# Patient Record
Sex: FEMALE | Race: WHITE | Employment: UNEMPLOYED | URBAN - NONMETROPOLITAN AREA
[De-identification: names, ages, dates, MRNs, and addresses within clinical notes are randomized per-mention and may not be internally consistent; named-entity substitution may affect disease eponyms.]

---

## 2019-01-23 ENCOUNTER — HOSPITAL ENCOUNTER (EMERGENCY)
Age: 85
Discharge: HOME | End: 2019-01-23
Attending: FAMILY MEDICINE
Payer: MEDICARE

## 2019-01-23 VITALS
WEIGHT: 111 LBS | DIASTOLIC BLOOD PRESSURE: 83 MMHG | OXYGEN SATURATION: 96 % | SYSTOLIC BLOOD PRESSURE: 178 MMHG | RESPIRATION RATE: 16 BRPM | HEART RATE: 91 BPM | TEMPERATURE: 98.5 F

## 2019-01-23 DIAGNOSIS — J02.9 VIRAL PHARYNGITIS: Primary | ICD-10-CM

## 2019-01-23 LAB
DIRECT EXAM: NORMAL
Lab: NORMAL
SPECIMEN DESCRIPTION: NORMAL
STATUS: NORMAL

## 2019-01-23 PROCEDURE — 99283 EMERGENCY DEPT VISIT LOW MDM: CPT

## 2019-01-23 PROCEDURE — 6370000000 HC RX 637 (ALT 250 FOR IP): Performed by: FAMILY MEDICINE

## 2019-01-23 PROCEDURE — 6360000002 HC RX W HCPCS: Performed by: FAMILY MEDICINE

## 2019-01-23 PROCEDURE — 87880 STREP A ASSAY W/OPTIC: CPT

## 2019-01-23 RX ORDER — HYDRALAZINE HYDROCHLORIDE 25 MG/1
25 TABLET, FILM COATED ORAL 3 TIMES DAILY
COMMUNITY
Start: 2016-03-14

## 2019-01-23 RX ORDER — DEXAMETHASONE SODIUM PHOSPHATE 10 MG/ML
6 INJECTION INTRAMUSCULAR; INTRAVENOUS ONCE
Status: COMPLETED | OUTPATIENT
Start: 2019-01-23 | End: 2019-01-23

## 2019-01-23 RX ORDER — PANTOPRAZOLE SODIUM 40 MG/1
40 TABLET, DELAYED RELEASE ORAL DAILY
COMMUNITY

## 2019-01-23 RX ORDER — LISINOPRIL 5 MG/1
5 TABLET ORAL 2 TIMES DAILY
COMMUNITY

## 2019-01-23 RX ORDER — PNV NO.95/FERROUS FUM/FOLIC AC 28MG-0.8MG
325 TABLET ORAL 2 TIMES DAILY
COMMUNITY
Start: 2016-03-14

## 2019-01-23 RX ORDER — LEVOTHYROXINE SODIUM 0.03 MG/1
25 TABLET ORAL DAILY
COMMUNITY
Start: 2016-03-14

## 2019-01-23 RX ORDER — CLOPIDOGREL BISULFATE 75 MG/1
75 TABLET ORAL DAILY
COMMUNITY
Start: 2016-03-14

## 2019-01-23 RX ADMIN — DEXAMETHASONE SODIUM PHOSPHATE 6 MG: 10 INJECTION INTRAMUSCULAR; INTRAVENOUS at 15:23

## 2019-01-23 RX ADMIN — LIDOCAINE HYDROCHLORIDE 15 ML: 20 SOLUTION ORAL; TOPICAL at 15:22

## 2019-01-23 ASSESSMENT — PAIN DESCRIPTION - DESCRIPTORS: DESCRIPTORS: SORE

## 2019-01-23 ASSESSMENT — PAIN DESCRIPTION - PAIN TYPE: TYPE: ACUTE PAIN

## 2019-01-23 ASSESSMENT — PAIN DESCRIPTION - ORIENTATION: ORIENTATION: RIGHT

## 2019-01-23 ASSESSMENT — PAIN DESCRIPTION - LOCATION: LOCATION: THROAT

## 2019-01-23 ASSESSMENT — PAIN SCALES - GENERAL: PAINLEVEL_OUTOF10: 5

## 2024-08-01 ENCOUNTER — APPOINTMENT (OUTPATIENT)
Dept: GENERAL RADIOLOGY | Age: 89
End: 2024-08-01
Payer: MEDICARE

## 2024-08-01 ENCOUNTER — APPOINTMENT (OUTPATIENT)
Dept: CT IMAGING | Age: 89
End: 2024-08-01
Payer: MEDICARE

## 2024-08-01 ENCOUNTER — HOSPITAL ENCOUNTER (EMERGENCY)
Age: 89
Discharge: HOME | End: 2024-08-01
Attending: EMERGENCY MEDICINE
Payer: MEDICARE

## 2024-08-01 VITALS
DIASTOLIC BLOOD PRESSURE: 66 MMHG | HEART RATE: 69 BPM | RESPIRATION RATE: 19 BRPM | WEIGHT: 105 LBS | BODY MASS INDEX: 20.62 KG/M2 | SYSTOLIC BLOOD PRESSURE: 165 MMHG | TEMPERATURE: 98.7 F | HEIGHT: 60 IN | OXYGEN SATURATION: 97 %
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
## 2024-08-01 DIAGNOSIS — S80.11XA CONTUSION OF MULTIPLE SITES OF RIGHT LOWER EXTREMITY, INITIAL ENCOUNTER: ICD-10-CM

## 2024-08-01 LAB
ANION GAP SERPL CALCULATED.3IONS-SCNC: 13 MMOL/L (ref 9–17)
BASOPHILS # BLD: 0.04 K/UL (ref 0–0.2)
BASOPHILS NFR BLD: 1 % (ref 0–2)
BUN SERPL-MCNC: 61 MG/DL (ref 8–23)
BUN/CREAT SERPL: 36 (ref 9–20)
CALCIUM SERPL-MCNC: 9.3 MG/DL (ref 8.6–10.4)
CHLORIDE SERPL-SCNC: 103 MMOL/L (ref 98–107)
CO2 SERPL-SCNC: 23 MMOL/L (ref 20–31)
CREAT SERPL-MCNC: 1.7 MG/DL (ref 0.5–0.9)
EOSINOPHIL # BLD: 0.06 K/UL (ref 0–0.4)
EOSINOPHILS RELATIVE PERCENT: 1 % (ref 0–5)
ERYTHROCYTE [DISTWIDTH] IN BLOOD BY AUTOMATED COUNT: 13.2 % (ref 12.1–15.2)
GFR, ESTIMATED: 28 ML/MIN/1.73M2
GLUCOSE SERPL-MCNC: 116 MG/DL (ref 70–99)
HCT VFR BLD AUTO: 32.9 % (ref 36–46)
HGB BLD-MCNC: 10.9 G/DL (ref 12–16)
IMM GRANULOCYTES # BLD AUTO: 0.02 K/UL (ref 0–0.3)
IMM GRANULOCYTES NFR BLD: 0 % (ref 0–5)
LYMPHOCYTES NFR BLD: 1.16 K/UL (ref 1–4.8)
LYMPHOCYTES RELATIVE PERCENT: 15 % (ref 15–40)
MCH RBC QN AUTO: 31.7 PG (ref 26–34)
MCHC RBC AUTO-ENTMCNC: 33.1 G/DL (ref 31–37)
MCV RBC AUTO: 95.6 FL (ref 80–100)
MONOCYTES NFR BLD: 0.47 K/UL (ref 0–1)
MONOCYTES NFR BLD: 6 % (ref 4–8)
NEUTROPHILS NFR BLD: 77 % (ref 47–75)
NEUTS SEG NFR BLD: 6.09 K/UL (ref 2.5–7)
PLATELET # BLD AUTO: 150 K/UL (ref 140–450)
PMV BLD AUTO: 10.4 FL (ref 6–12)
POTASSIUM SERPL-SCNC: 4.5 MMOL/L (ref 3.7–5.3)
RBC # BLD AUTO: 3.44 M/UL (ref 4–5.2)
SODIUM SERPL-SCNC: 139 MMOL/L (ref 135–144)
WBC OTHER # BLD: 7.8 K/UL (ref 3.5–11)

## 2024-08-01 PROCEDURE — 96374 THER/PROPH/DIAG INJ IV PUSH: CPT

## 2024-08-01 PROCEDURE — 80048 BASIC METABOLIC PNL TOTAL CA: CPT

## 2024-08-01 PROCEDURE — 73502 X-RAY EXAM HIP UNI 2-3 VIEWS: CPT

## 2024-08-01 PROCEDURE — 70450 CT HEAD/BRAIN W/O DYE: CPT

## 2024-08-01 PROCEDURE — 36415 COLL VENOUS BLD VENIPUNCTURE: CPT

## 2024-08-01 PROCEDURE — 99284 EMERGENCY DEPT VISIT MOD MDM: CPT

## 2024-08-01 PROCEDURE — 85025 COMPLETE CBC W/AUTO DIFF WBC: CPT

## 2024-08-01 PROCEDURE — 6360000002 HC RX W HCPCS: Performed by: EMERGENCY MEDICINE

## 2024-08-01 PROCEDURE — 73552 X-RAY EXAM OF FEMUR 2/>: CPT

## 2024-08-01 RX ORDER — FENTANYL CITRATE 50 UG/ML
25 INJECTION, SOLUTION INTRAMUSCULAR; INTRAVENOUS ONCE
Status: COMPLETED | OUTPATIENT
Start: 2024-08-01 | End: 2024-08-01

## 2024-08-01 RX ORDER — LANOLIN ALCOHOL/MO/W.PET/CERES
1000 CREAM (GRAM) TOPICAL DAILY
COMMUNITY
Start: 2019-01-28

## 2024-08-01 RX ADMIN — FENTANYL CITRATE 25 MCG: 50 INJECTION, SOLUTION INTRAMUSCULAR; INTRAVENOUS at 20:08

## 2024-08-01 ASSESSMENT — PAIN - FUNCTIONAL ASSESSMENT
PAIN_FUNCTIONAL_ASSESSMENT: PREVENTS OR INTERFERES SOME ACTIVE ACTIVITIES AND ADLS
PAIN_FUNCTIONAL_ASSESSMENT: PREVENTS OR INTERFERES SOME ACTIVE ACTIVITIES AND ADLS
PAIN_FUNCTIONAL_ASSESSMENT: 0-10

## 2024-08-01 ASSESSMENT — ENCOUNTER SYMPTOMS
CHEST TIGHTNESS: 0
RHINORRHEA: 0
TROUBLE SWALLOWING: 0
SORE THROAT: 0
SINUS PRESSURE: 0
CONSTIPATION: 0
NAUSEA: 0
BACK PAIN: 0
EYE PAIN: 0
FACIAL SWELLING: 0
COUGH: 0
ABDOMINAL PAIN: 0
WHEEZING: 0
BLOOD IN STOOL: 0
DIARRHEA: 0
SHORTNESS OF BREATH: 0
VOMITING: 0
COLOR CHANGE: 0
EYE DISCHARGE: 0
EYE REDNESS: 0

## 2024-08-01 ASSESSMENT — PAIN DESCRIPTION - DESCRIPTORS
DESCRIPTORS: ACHING
DESCRIPTORS: ACHING

## 2024-08-01 ASSESSMENT — PAIN DESCRIPTION - ONSET: ONSET: ON-GOING

## 2024-08-01 ASSESSMENT — LIFESTYLE VARIABLES
HOW MANY STANDARD DRINKS CONTAINING ALCOHOL DO YOU HAVE ON A TYPICAL DAY: PATIENT DOES NOT DRINK
HOW OFTEN DO YOU HAVE A DRINK CONTAINING ALCOHOL: NEVER

## 2024-08-01 ASSESSMENT — PAIN DESCRIPTION - ORIENTATION
ORIENTATION: RIGHT
ORIENTATION: RIGHT

## 2024-08-01 ASSESSMENT — PAIN DESCRIPTION - FREQUENCY
FREQUENCY: CONTINUOUS
FREQUENCY: CONTINUOUS

## 2024-08-01 ASSESSMENT — PAIN DESCRIPTION - PAIN TYPE
TYPE: ACUTE PAIN
TYPE: ACUTE PAIN

## 2024-08-01 ASSESSMENT — PAIN DESCRIPTION - LOCATION
LOCATION: LEG
LOCATION: LEG

## 2024-08-01 ASSESSMENT — PAIN SCALES - GENERAL
PAINLEVEL_OUTOF10: 5
PAINLEVEL_OUTOF10: 8

## 2024-08-01 NOTE — ED PROVIDER NOTES
eMERGENCY dEPARTMENT eNCOUnter      Pt Name: Nissa Sawyer  MRN: [id number]  Birthdate 9/28/1930  Date of evaluation: 8/1/24      CHIEF COMPLAINT       Chief Complaint   Patient presents with    Fall     C/o right leg pain after falling this morning around 0700 on a carpetted floor. Patient unknown if head was hit.         HISTORY OF PRESENT ILLNESS    Nissa Sawyer is a 89 y.o. female who presents complaining of fall.  Patient states she had a mechanical fall landing on her right leg.  Patient states she is not able to put any weight on it or straighten her leg and she thinks it is broken.  Patient has a history of hypertension and CVA in the past.  Patient takes Plavix.  Patient states she has no head injury no headache no loss conscious no neck or back pain no numbness tingling or weakness.      REVIEW OF SYSTEMS       Review of Systems   Constitutional:  Negative for activity change, appetite change, chills, diaphoresis and fever.   HENT:  Negative for congestion, ear pain, facial swelling, nosebleeds, rhinorrhea, sinus pressure, sore throat and trouble swallowing.    Eyes:  Negative for pain, discharge and redness.   Respiratory:  Negative for cough, chest tightness, shortness of breath and wheezing.    Cardiovascular:  Negative for chest pain, palpitations and leg swelling.   Gastrointestinal:  Negative for abdominal pain, blood in stool, constipation, diarrhea, nausea and vomiting.   Genitourinary:  Negative for difficulty urinating, dysuria, flank pain, frequency, genital sores and hematuria.   Musculoskeletal:  Negative for arthralgias, back pain, gait problem, joint swelling, myalgias and neck pain.        Fall with leg pain   Skin:  Negative for color change, pallor, rash and wound.   Neurological:  Negative for dizziness, tremors, seizures, syncope, speech difficulty, weakness, numbness and headaches.   Psychiatric/Behavioral:  Negative for confusion, decreased concentration, hallucinations, self-injury,

## 2024-08-02 NOTE — ED NOTES
Ticket to ride completed. The following information was reported off:  Name  Allergies  Orientation Level  Destination  Safety Issues  Code Status  Oxygen Requirements  Special needs including mobility, language, communication

## 2025-05-12 ENCOUNTER — HOSPITAL ENCOUNTER (INPATIENT)
Age: 89
LOS: 3 days | Discharge: NURSING, REHAB OR LONG-TERM CARE | End: 2025-05-15
Attending: EMERGENCY MEDICINE | Admitting: INTERNAL MEDICINE
Payer: MEDICARE

## 2025-05-12 ENCOUNTER — APPOINTMENT (OUTPATIENT)
Dept: GENERAL RADIOLOGY | Age: 89
End: 2025-05-12
Payer: MEDICARE

## 2025-05-12 DIAGNOSIS — N18.2 ACUTE RENAL FAILURE WITH ACUTE TUBULAR NECROSIS SUPERIMPOSED ON STAGE 2 CHRONIC KIDNEY DISEASE: ICD-10-CM

## 2025-05-12 DIAGNOSIS — R53.1 GENERALIZED WEAKNESS: Primary | ICD-10-CM

## 2025-05-12 DIAGNOSIS — N17.0 ACUTE RENAL FAILURE WITH ACUTE TUBULAR NECROSIS SUPERIMPOSED ON STAGE 2 CHRONIC KIDNEY DISEASE: ICD-10-CM

## 2025-05-12 DIAGNOSIS — N39.0 URINARY TRACT INFECTION WITHOUT HEMATURIA, SITE UNSPECIFIED: ICD-10-CM

## 2025-05-12 DIAGNOSIS — J18.9 PNEUMONIA OF RIGHT LOWER LOBE DUE TO INFECTIOUS ORGANISM: ICD-10-CM

## 2025-05-12 PROBLEM — J15.9 COMMUNITY ACQUIRED BACTERIAL PNEUMONIA: Status: ACTIVE | Noted: 2025-05-12

## 2025-05-12 PROBLEM — N30.00 ACUTE CYSTITIS WITHOUT HEMATURIA: Status: ACTIVE | Noted: 2025-05-12

## 2025-05-12 LAB
-: ABNORMAL
ABSOLUTE BANDS: 0.86 K/UL (ref 0–1)
ALBUMIN SERPL-MCNC: 3.4 G/DL (ref 3.5–5.2)
ALBUMIN/GLOB SERPL: 0.9 {RATIO} (ref 1–2.5)
ALP SERPL-CCNC: 105 U/L (ref 35–104)
ALT SERPL-CCNC: 30 U/L (ref 5–33)
ANION GAP SERPL CALCULATED.3IONS-SCNC: 20 MMOL/L (ref 9–17)
AST SERPL-CCNC: 82 U/L
B PARAP IS1001 DNA NPH QL NAA+NON-PROBE: NOT DETECTED
B PERT DNA SPEC QL NAA+PROBE: NOT DETECTED
BACTERIA URNS QL MICRO: ABNORMAL
BANDS: 7 % (ref 0–10)
BASOPHILS # BLD: ABNORMAL K/UL (ref 0–0.2)
BASOPHILS NFR BLD: ABNORMAL % (ref 0–2)
BILIRUB SERPL-MCNC: 0.4 MG/DL (ref 0.3–1.2)
BILIRUB UR QL STRIP: NEGATIVE
BUN SERPL-MCNC: 76 MG/DL (ref 8–23)
C PNEUM DNA NPH QL NAA+NON-PROBE: NOT DETECTED
CALCIUM SERPL-MCNC: 9.4 MG/DL (ref 8.6–10.4)
CHLORIDE SERPL-SCNC: 98 MMOL/L (ref 98–107)
CLARITY UR: ABNORMAL
CO2 SERPL-SCNC: 18 MMOL/L (ref 20–31)
COLOR UR: YELLOW
COMMENT: ABNORMAL
CREAT SERPL-MCNC: 3.1 MG/DL (ref 0.5–0.9)
EOSINOPHIL # BLD: 0.12 K/UL (ref 0–0.4)
EOSINOPHILS RELATIVE PERCENT: 1 % (ref 0–5)
EPI CELLS #/AREA URNS HPF: ABNORMAL /HPF
ERYTHROCYTE [DISTWIDTH] IN BLOOD BY AUTOMATED COUNT: 13.7 % (ref 12.1–15.2)
FLUAV AG SPEC QL: NEGATIVE
FLUAV RNA NPH QL NAA+NON-PROBE: NOT DETECTED
FLUBV AG SPEC QL: NEGATIVE
FLUBV RNA NPH QL NAA+NON-PROBE: NOT DETECTED
GFR, ESTIMATED: 14 ML/MIN/1.73M2
GLUCOSE SERPL-MCNC: 91 MG/DL (ref 70–99)
GLUCOSE UR STRIP-MCNC: NEGATIVE MG/DL
HADV DNA NPH QL NAA+NON-PROBE: NOT DETECTED
HCOV 229E RNA NPH QL NAA+NON-PROBE: NOT DETECTED
HCOV HKU1 RNA NPH QL NAA+NON-PROBE: NOT DETECTED
HCOV NL63 RNA NPH QL NAA+NON-PROBE: NOT DETECTED
HCOV OC43 RNA NPH QL NAA+NON-PROBE: NOT DETECTED
HCT VFR BLD AUTO: 37.7 % (ref 36–46)
HGB BLD-MCNC: 13 G/DL (ref 12–16)
HGB UR QL STRIP.AUTO: ABNORMAL
HMPV RNA NPH QL NAA+NON-PROBE: NOT DETECTED
HPIV1 RNA NPH QL NAA+NON-PROBE: NOT DETECTED
HPIV2 RNA NPH QL NAA+NON-PROBE: NOT DETECTED
HPIV3 RNA NPH QL NAA+NON-PROBE: DETECTED
HPIV4 RNA NPH QL NAA+NON-PROBE: NOT DETECTED
IMM GRANULOCYTES # BLD AUTO: ABNORMAL K/UL (ref 0–0.3)
IMM GRANULOCYTES NFR BLD: ABNORMAL %
KETONES UR STRIP-MCNC: NEGATIVE MG/DL
LACTATE BLDV-SCNC: 1.8 MMOL/L (ref 0.5–1.9)
LEUKOCYTE ESTERASE UR QL STRIP: ABNORMAL
LYMPHOCYTES NFR BLD: 0.62 K/UL (ref 1–4.8)
LYMPHOCYTES RELATIVE PERCENT: 5 % (ref 15–40)
M PNEUMO DNA NPH QL NAA+NON-PROBE: NOT DETECTED
MCH RBC QN AUTO: 30.7 PG (ref 26–34)
MCHC RBC AUTO-ENTMCNC: 34.5 G/DL (ref 31–37)
MCV RBC AUTO: 89.1 FL (ref 80–100)
MONOCYTES NFR BLD: 0.25 K/UL (ref 0–1)
MONOCYTES NFR BLD: 2 % (ref 4–8)
MORPHOLOGY: ABNORMAL
MORPHOLOGY: ABNORMAL
NEUTROPHILS NFR BLD: 85 % (ref 47–75)
NEUTS SEG NFR BLD: 10.45 K/UL (ref 2.5–7)
NITRITE UR QL STRIP: NEGATIVE
PH UR STRIP: 5 [PH] (ref 5–8)
PLATELET # BLD AUTO: 211 K/UL (ref 140–450)
PMV BLD AUTO: 11 FL (ref 6–12)
POTASSIUM SERPL-SCNC: 4.7 MMOL/L (ref 3.7–5.3)
PROT SERPL-MCNC: 7.4 G/DL (ref 6.4–8.3)
PROT UR STRIP-MCNC: ABNORMAL MG/DL
RBC # BLD AUTO: 4.23 M/UL (ref 4–5.2)
RBC #/AREA URNS HPF: ABNORMAL /HPF (ref 0–2)
RSV RNA NPH QL NAA+NON-PROBE: NOT DETECTED
RV+EV RNA NPH QL NAA+NON-PROBE: NOT DETECTED
SARS-COV-2 RDRP RESP QL NAA+PROBE: NOT DETECTED
SARS-COV-2 RNA NPH QL NAA+NON-PROBE: NOT DETECTED
SODIUM SERPL-SCNC: 136 MMOL/L (ref 135–144)
SP GR UR STRIP: 1.02 (ref 1–1.03)
SPECIMEN DESCRIPTION: ABNORMAL
SPECIMEN DESCRIPTION: NORMAL
UROBILINOGEN UR STRIP-ACNC: NORMAL EU/DL (ref 0–1)
WBC #/AREA URNS HPF: ABNORMAL /HPF
WBC OTHER # BLD: 12.3 K/UL (ref 3.5–11)

## 2025-05-12 PROCEDURE — 81001 URINALYSIS AUTO W/SCOPE: CPT

## 2025-05-12 PROCEDURE — 6370000000 HC RX 637 (ALT 250 FOR IP): Performed by: INTERNAL MEDICINE

## 2025-05-12 PROCEDURE — 87186 SC STD MICRODIL/AGAR DIL: CPT

## 2025-05-12 PROCEDURE — 99285 EMERGENCY DEPT VISIT HI MDM: CPT

## 2025-05-12 PROCEDURE — 87088 URINE BACTERIA CULTURE: CPT

## 2025-05-12 PROCEDURE — 85025 COMPLETE CBC W/AUTO DIFF WBC: CPT

## 2025-05-12 PROCEDURE — 51701 INSERT BLADDER CATHETER: CPT

## 2025-05-12 PROCEDURE — 80053 COMPREHEN METABOLIC PANEL: CPT

## 2025-05-12 PROCEDURE — 71045 X-RAY EXAM CHEST 1 VIEW: CPT

## 2025-05-12 PROCEDURE — 87804 INFLUENZA ASSAY W/OPTIC: CPT

## 2025-05-12 PROCEDURE — 2500000003 HC RX 250 WO HCPCS: Performed by: EMERGENCY MEDICINE

## 2025-05-12 PROCEDURE — 83605 ASSAY OF LACTIC ACID: CPT

## 2025-05-12 PROCEDURE — 87635 SARS-COV-2 COVID-19 AMP PRB: CPT

## 2025-05-12 PROCEDURE — 87040 BLOOD CULTURE FOR BACTERIA: CPT

## 2025-05-12 PROCEDURE — 87086 URINE CULTURE/COLONY COUNT: CPT

## 2025-05-12 PROCEDURE — 87899 AGENT NOS ASSAY W/OPTIC: CPT

## 2025-05-12 PROCEDURE — 6360000002 HC RX W HCPCS: Performed by: INTERNAL MEDICINE

## 2025-05-12 PROCEDURE — 94761 N-INVAS EAR/PLS OXIMETRY MLT: CPT

## 2025-05-12 PROCEDURE — 96374 THER/PROPH/DIAG INJ IV PUSH: CPT

## 2025-05-12 PROCEDURE — 1200000000 HC SEMI PRIVATE

## 2025-05-12 PROCEDURE — 2580000003 HC RX 258: Performed by: EMERGENCY MEDICINE

## 2025-05-12 PROCEDURE — 36415 COLL VENOUS BLD VENIPUNCTURE: CPT

## 2025-05-12 PROCEDURE — 0202U NFCT DS 22 TRGT SARS-COV-2: CPT

## 2025-05-12 PROCEDURE — 6360000002 HC RX W HCPCS: Performed by: EMERGENCY MEDICINE

## 2025-05-12 PROCEDURE — 2580000003 HC RX 258: Performed by: INTERNAL MEDICINE

## 2025-05-12 PROCEDURE — 87449 NOS EACH ORGANISM AG IA: CPT

## 2025-05-12 RX ORDER — SODIUM CHLORIDE 9 MG/ML
INJECTION, SOLUTION INTRAVENOUS PRN
Status: DISCONTINUED | OUTPATIENT
Start: 2025-05-12 | End: 2025-05-15 | Stop reason: HOSPADM

## 2025-05-12 RX ORDER — AZITHROMYCIN 250 MG/1
500 TABLET, FILM COATED ORAL EVERY 24 HOURS
Status: COMPLETED | OUTPATIENT
Start: 2025-05-12 | End: 2025-05-14

## 2025-05-12 RX ORDER — HEPARIN SODIUM 5000 [USP'U]/ML
5000 INJECTION, SOLUTION INTRAVENOUS; SUBCUTANEOUS 2 TIMES DAILY
Status: DISCONTINUED | OUTPATIENT
Start: 2025-05-12 | End: 2025-05-15 | Stop reason: HOSPADM

## 2025-05-12 RX ORDER — ONDANSETRON 2 MG/ML
4 INJECTION INTRAMUSCULAR; INTRAVENOUS EVERY 6 HOURS PRN
Status: DISCONTINUED | OUTPATIENT
Start: 2025-05-12 | End: 2025-05-15 | Stop reason: HOSPADM

## 2025-05-12 RX ORDER — ACETAMINOPHEN 325 MG/1
650 TABLET ORAL EVERY 6 HOURS PRN
Status: DISCONTINUED | OUTPATIENT
Start: 2025-05-12 | End: 2025-05-12 | Stop reason: SDUPTHER

## 2025-05-12 RX ORDER — VITAMIN B COMPLEX
1000 TABLET ORAL
Status: DISCONTINUED | OUTPATIENT
Start: 2025-05-13 | End: 2025-05-15 | Stop reason: HOSPADM

## 2025-05-12 RX ORDER — ACETAMINOPHEN 650 MG/1
650 SUPPOSITORY RECTAL EVERY 6 HOURS PRN
Status: DISCONTINUED | OUTPATIENT
Start: 2025-05-12 | End: 2025-05-12 | Stop reason: SDUPTHER

## 2025-05-12 RX ORDER — SODIUM CHLORIDE 0.9 % (FLUSH) 0.9 %
5-40 SYRINGE (ML) INJECTION EVERY 12 HOURS SCHEDULED
Status: DISCONTINUED | OUTPATIENT
Start: 2025-05-12 | End: 2025-05-15 | Stop reason: HOSPADM

## 2025-05-12 RX ORDER — SODIUM CHLORIDE 0.9 % (FLUSH) 0.9 %
5-40 SYRINGE (ML) INJECTION PRN
Status: DISCONTINUED | OUTPATIENT
Start: 2025-05-12 | End: 2025-05-12

## 2025-05-12 RX ORDER — CLOPIDOGREL BISULFATE 75 MG/1
75 TABLET ORAL DAILY
Status: DISCONTINUED | OUTPATIENT
Start: 2025-05-12 | End: 2025-05-15 | Stop reason: HOSPADM

## 2025-05-12 RX ORDER — SODIUM CHLORIDE 0.9 % (FLUSH) 0.9 %
5-40 SYRINGE (ML) INJECTION PRN
Status: DISCONTINUED | OUTPATIENT
Start: 2025-05-12 | End: 2025-05-15 | Stop reason: HOSPADM

## 2025-05-12 RX ORDER — POLYETHYLENE GLYCOL 3350 17 G/17G
17 POWDER, FOR SOLUTION ORAL DAILY PRN
Status: DISCONTINUED | OUTPATIENT
Start: 2025-05-12 | End: 2025-05-15 | Stop reason: HOSPADM

## 2025-05-12 RX ORDER — SODIUM CHLORIDE 9 MG/ML
INJECTION, SOLUTION INTRAVENOUS CONTINUOUS
Status: ACTIVE | OUTPATIENT
Start: 2025-05-12 | End: 2025-05-14

## 2025-05-12 RX ORDER — 0.9 % SODIUM CHLORIDE 0.9 %
1000 INTRAVENOUS SOLUTION INTRAVENOUS ONCE
Status: DISCONTINUED | OUTPATIENT
Start: 2025-05-12 | End: 2025-05-12

## 2025-05-12 RX ORDER — SODIUM CHLORIDE 9 MG/ML
INJECTION, SOLUTION INTRAVENOUS PRN
Status: DISCONTINUED | OUTPATIENT
Start: 2025-05-12 | End: 2025-05-12

## 2025-05-12 RX ORDER — FERROUS SULFATE 325(65) MG
325 TABLET ORAL 2 TIMES DAILY
Status: DISCONTINUED | OUTPATIENT
Start: 2025-05-12 | End: 2025-05-15 | Stop reason: HOSPADM

## 2025-05-12 RX ORDER — ACETAMINOPHEN 325 MG/1
650 TABLET ORAL EVERY 4 HOURS PRN
Status: DISCONTINUED | OUTPATIENT
Start: 2025-05-12 | End: 2025-05-15 | Stop reason: HOSPADM

## 2025-05-12 RX ORDER — 0.9 % SODIUM CHLORIDE 0.9 %
1000 INTRAVENOUS SOLUTION INTRAVENOUS ONCE
Status: COMPLETED | OUTPATIENT
Start: 2025-05-12 | End: 2025-05-12

## 2025-05-12 RX ORDER — LEVOTHYROXINE SODIUM 25 UG/1
25 TABLET ORAL DAILY
Status: DISCONTINUED | OUTPATIENT
Start: 2025-05-12 | End: 2025-05-15 | Stop reason: HOSPADM

## 2025-05-12 RX ORDER — ONDANSETRON 4 MG/1
4 TABLET, ORALLY DISINTEGRATING ORAL EVERY 8 HOURS PRN
Status: DISCONTINUED | OUTPATIENT
Start: 2025-05-12 | End: 2025-05-15 | Stop reason: HOSPADM

## 2025-05-12 RX ORDER — LANOLIN ALCOHOL/MO/W.PET/CERES
1000 CREAM (GRAM) TOPICAL DAILY
Status: DISCONTINUED | OUTPATIENT
Start: 2025-05-12 | End: 2025-05-15 | Stop reason: HOSPADM

## 2025-05-12 RX ORDER — SODIUM CHLORIDE 0.9 % (FLUSH) 0.9 %
5-40 SYRINGE (ML) INJECTION EVERY 12 HOURS SCHEDULED
Status: DISCONTINUED | OUTPATIENT
Start: 2025-05-12 | End: 2025-05-12

## 2025-05-12 RX ADMIN — Medication 1000 MCG: at 17:08

## 2025-05-12 RX ADMIN — AZITHROMYCIN DIHYDRATE 500 MG: 250 TABLET ORAL at 17:07

## 2025-05-12 RX ADMIN — SODIUM CHLORIDE 1000 ML: 0.9 INJECTION, SOLUTION INTRAVENOUS at 13:15

## 2025-05-12 RX ADMIN — LEVOTHYROXINE SODIUM 25 MCG: 0.03 TABLET ORAL at 17:08

## 2025-05-12 RX ADMIN — HEPARIN SODIUM 5000 UNITS: 5000 INJECTION INTRAVENOUS; SUBCUTANEOUS at 21:15

## 2025-05-12 RX ADMIN — CLOPIDOGREL BISULFATE 75 MG: 75 TABLET, FILM COATED ORAL at 17:07

## 2025-05-12 RX ADMIN — WATER 1000 MG: 1 INJECTION INTRAMUSCULAR; INTRAVENOUS; SUBCUTANEOUS at 14:24

## 2025-05-12 RX ADMIN — SODIUM CHLORIDE: 0.9 INJECTION, SOLUTION INTRAVENOUS at 16:04

## 2025-05-12 RX ADMIN — FERROUS SULFATE TAB 325 MG (65 MG ELEMENTAL FE) 325 MG: 325 (65 FE) TAB at 21:15

## 2025-05-12 ASSESSMENT — PAIN - FUNCTIONAL ASSESSMENT: PAIN_FUNCTIONAL_ASSESSMENT: NONE - DENIES PAIN

## 2025-05-12 ASSESSMENT — PAIN SCALES - GENERAL: PAINLEVEL_OUTOF10: 0

## 2025-05-12 NOTE — ED NOTES
[facility]. Sheila stated that she only has lisinopril 5mg and vit d3 on file. Writer then called her PCP office for validation of medications and code status. Norma from Dr. Ahmadi's office stated that the patient has legal issues with POA and family members. I was given the number of her nephew who Norma stated is in charge. I do not have that number or name on file. Supervisor aware of situation. Will call social work to notify them of situation.

## 2025-05-12 NOTE — ED NOTES
Medication list unable to be reviewed or confirmed. Please see previous note on which medications [facility] has recently filled.

## 2025-05-12 NOTE — PROGRESS NOTES
Dorothea Geiger Niece Contact number, ([phone]) at the bedside for comfort, Pt resting comfortably.

## 2025-05-12 NOTE — PROGRESS NOTES
SW called to [facility] per request of nursing as pt had a court hearing with APS in March 2025 trying to locate family to talk to in regards to admitting patient. MAGDALENO spoke with Sienna in administration and she could not provide SW the name and number due to privacy and the case being closed there but said that she would try to determine who was to be in charge and have them call the ER nurse. SW called to ER and left a message with registration regarding the same. Vesta Bradley, MSW, LSW 5/12/2025

## 2025-05-12 NOTE — ED PROVIDER NOTES
EMERGENCY DEPARTMENT ENCOUNTER      CHIEF COMPLAINT    Chief Complaint   Patient presents with    Illness     Vomiting, diarrhea, weakness for past 2-3 days. Per report from [facility], patient unable to walk and very weak.        LENARD Sawyer is a 89 y.o. female who presentsto ED via EMS.  Patient lives at home.  Patient has been congested for several days.  Patient has been progressively getting weaker to the point that she has not been able to ambulate at home.  Patient's grandson called EMS to transport the patient to ED.    PAST MEDICAL HISTORY    Past Medical History:   Diagnosis Date    Cerebral artery occlusion with cerebral infarction (HCC)     Hypertension     Thyroid disease        SURGICAL HISTORY    Past Surgical History:   Procedure Laterality Date    LEG SURGERY Right     [facility] [employer] [facility]       CURRENT MEDICATIONS    Current Outpatient Rx   Medication Sig Dispense Refill    vitamin B-12 (CYANOCOBALAMIN) 1000 MCG tablet Take 1 tablet by mouth daily      Cholecalciferol (VITAMIN D-3 PO) Take by mouth      NONFORMULARY OTC blood pressure support vitamin      clopidogrel (PLAVIX) 75 MG tablet Take 1 tablet by mouth daily      Ferrous Sulfate (IRON) 325 (65 Fe) MG TABS Take 325 mg by mouth 2 times daily      hydrALAZINE (APRESOLINE) 25 MG tablet Take 25 mg by mouth 3 times daily (Patient not taking: Reported on 8/1/2024)      levothyroxine (SYNTHROID) 25 MCG tablet Take 1 tablet by mouth daily      lisinopril (PRINIVIL;ZESTRIL) 5 MG tablet Take 5 mg by mouth 2 times daily (Patient not taking: Reported on 8/1/2024)      pantoprazole (PROTONIX) 40 MG tablet Take 40 mg by mouth daily (Patient not taking: Reported on 8/1/2024)         ALLERGIES    Allergies   Allergen Reactions    Amlodipine      Edema in LE    Metoprolol Hives and Itching       FAMILY HISTORY    No family history on file.    SOCIAL HISTORY    Social History     Socioeconomic History    Marital status:    Tobacco Use

## 2025-05-12 NOTE — ED NOTES
Patient arrived to ED via [facility] due to weakness and slight confusion at home. Per report, grandson stated that patient cannot walk and has been having diarrhea. Patient arrived with large amount of dried stool in vagina and malathi area. Stool was dried and hard. Writer and another nurse cleaned patient and changed patient's brief. Patient cleansed with soap and water and multiple packs of wipes. Straight cath completed post malathi care.

## 2025-05-12 NOTE — ED NOTES
Social work called and notified. She will call to [facility] for further information. Writer called and spoke with Yariel who was with patient earlier in the ED, he stated that she lives at home but he and a nurse stop in to check on her. Yariel stated that her other nephew Sumeet is in charge of medical decisions. Phone number received.

## 2025-05-13 LAB
BASOPHILS # BLD: 0 K/UL (ref 0–0.2)
BASOPHILS NFR BLD: 0 % (ref 0–2)
EOSINOPHIL # BLD: 0.01 K/UL (ref 0–0.4)
EOSINOPHILS RELATIVE PERCENT: 0 % (ref 0–5)
ERYTHROCYTE [DISTWIDTH] IN BLOOD BY AUTOMATED COUNT: 13.7 % (ref 12.1–15.2)
HCT VFR BLD AUTO: 27.7 % (ref 36–46)
HGB BLD-MCNC: 9.9 G/DL (ref 12–16)
IMM GRANULOCYTES # BLD AUTO: 0.03 K/UL (ref 0–0.3)
IMM GRANULOCYTES NFR BLD: 0 % (ref 0–5)
L PNEUMO1 AG UR QL IA.RAPID: NEGATIVE
LYMPHOCYTES NFR BLD: 0.7 K/UL (ref 1–4.8)
LYMPHOCYTES RELATIVE PERCENT: 8 % (ref 15–40)
MCH RBC QN AUTO: 31.5 PG (ref 26–34)
MCHC RBC AUTO-ENTMCNC: 35.7 G/DL (ref 31–37)
MCV RBC AUTO: 88.2 FL (ref 80–100)
MONOCYTES NFR BLD: 0.4 K/UL (ref 0–1)
MONOCYTES NFR BLD: 4 % (ref 4–8)
NEUTROPHILS NFR BLD: 87 % (ref 47–75)
NEUTS SEG NFR BLD: 7.88 K/UL (ref 2.5–7)
PLATELET # BLD AUTO: 181 K/UL (ref 140–450)
PMV BLD AUTO: 11.2 FL (ref 6–12)
RBC # BLD AUTO: 3.14 M/UL (ref 4–5.2)
S PNEUM AG SPEC QL: POSITIVE
SPECIMEN SOURCE: NORMAL
WBC OTHER # BLD: 9 K/UL (ref 3.5–11)

## 2025-05-13 PROCEDURE — 94761 N-INVAS EAR/PLS OXIMETRY MLT: CPT

## 2025-05-13 PROCEDURE — 2500000003 HC RX 250 WO HCPCS: Performed by: INTERNAL MEDICINE

## 2025-05-13 PROCEDURE — 85025 COMPLETE CBC W/AUTO DIFF WBC: CPT

## 2025-05-13 PROCEDURE — 1200000000 HC SEMI PRIVATE

## 2025-05-13 PROCEDURE — 97162 PT EVAL MOD COMPLEX 30 MIN: CPT

## 2025-05-13 PROCEDURE — 6360000002 HC RX W HCPCS: Performed by: INTERNAL MEDICINE

## 2025-05-13 PROCEDURE — 36415 COLL VENOUS BLD VENIPUNCTURE: CPT

## 2025-05-13 PROCEDURE — 6370000000 HC RX 637 (ALT 250 FOR IP): Performed by: INTERNAL MEDICINE

## 2025-05-13 PROCEDURE — 2580000003 HC RX 258: Performed by: INTERNAL MEDICINE

## 2025-05-13 RX ADMIN — LEVOTHYROXINE SODIUM 25 MCG: 0.03 TABLET ORAL at 05:57

## 2025-05-13 RX ADMIN — Medication 1000 UNITS: at 07:49

## 2025-05-13 RX ADMIN — SODIUM CHLORIDE: 0.9 INJECTION, SOLUTION INTRAVENOUS at 11:40

## 2025-05-13 RX ADMIN — HEPARIN SODIUM 5000 UNITS: 5000 INJECTION INTRAVENOUS; SUBCUTANEOUS at 07:50

## 2025-05-13 RX ADMIN — Medication 1000 MCG: at 07:50

## 2025-05-13 RX ADMIN — AZITHROMYCIN DIHYDRATE 500 MG: 250 TABLET ORAL at 16:33

## 2025-05-13 RX ADMIN — HEPARIN SODIUM 5000 UNITS: 5000 INJECTION INTRAVENOUS; SUBCUTANEOUS at 20:45

## 2025-05-13 RX ADMIN — FERROUS SULFATE TAB 325 MG (65 MG ELEMENTAL FE) 325 MG: 325 (65 FE) TAB at 20:45

## 2025-05-13 RX ADMIN — FERROUS SULFATE TAB 325 MG (65 MG ELEMENTAL FE) 325 MG: 325 (65 FE) TAB at 07:49

## 2025-05-13 RX ADMIN — WATER 1000 MG: 1 INJECTION INTRAMUSCULAR; INTRAVENOUS; SUBCUTANEOUS at 14:08

## 2025-05-13 RX ADMIN — CLOPIDOGREL BISULFATE 75 MG: 75 TABLET, FILM COATED ORAL at 07:49

## 2025-05-13 ASSESSMENT — PAIN SCALES - GENERAL: PAINLEVEL_OUTOF10: 0

## 2025-05-13 ASSESSMENT — PAIN - FUNCTIONAL ASSESSMENT: PAIN_FUNCTIONAL_ASSESSMENT: NONE - DENIES PAIN

## 2025-05-13 NOTE — THERAPY EVALUATION
[facility]  Phone: [phone]    Occupational Therapy Evaluation    Date: 2025  Patient Name: Nissa Sawyer        MRN: [id number]    : 1930  (89 y.o.)  Gender: female   Referring Practitioner: Dr. Solorzano  Diagnosis: PNA  Additional Pertinent Hx: Admitted to [facility] on 2025 due to community-acquired bacterial PNA. Referred to OT services to assess ability to care for self.    Past Medical History:   Diagnosis Date    Cerebral artery occlusion with cerebral infarction (HCC)     Hypertension     Thyroid disease      Past Surgical History:   Procedure Laterality Date    LEG SURGERY Right     [facility] [facility]     Subjective:     Subjective: Patient was lying supine in bed upon OT arrival. Was agreeable to OT evaluation.    Objective:     Social/Functional History:  Lives With: Alone  Type of Home: House  Bathroom Shower/Tub: Shower chair with back  Bathroom Toilet: Bedside commode  Bathroom Equipment: Grab bars in shower, Grab bars around toilet  Home Equipment: Walker - Rolling, Wheelchair - Manual    PLOF:  Receives Help From: Personal care attendant  Prior Level of Assist for ADLs: Needs assistance  Prior Level of Assist for Homemaking: Needs assistance  Prior Level of Assist for Transfers: Needs assistance    ADLs:  Feeding: Maximum assistance  Grooming: Maximum assistance  UE Bathing: Maximum assistance  LE Bathing: Maximum assistance  UE Dressing: Maximum assistance  LE Dressing: Maximum assistance  Toileting: Maximum assistance    Transfers:  Supine to Sit: Partial/Moderate assistance  Sit to stand: Moderate assistance, 2 Person assistance   Stand to sit: Moderate assistance, 2 Person assistance    Assessment:     Assessment: Patient was lying supine in bed upon OT arrival. Was agreeable to OT evaluation. Completed ADLs and functional transfers as documented above (based on clinical reasoning and observation). Very limited movement noted in BUE with contractures noted

## 2025-05-13 NOTE — PLAN OF CARE
[facility]  Phone: [phone]    Inpatient Occupational Therapy Plan of Care  OT Orders Received and Evaluation Complete    Date: 2025  Patient Name: Nissa Sawyer        MRN: [id number]    : 1930  (89 y.o.)  Referring Practitioner: Dr. Solorzano  Diagnosis: PNA  Treatment Diagnosis: PNA    Identified Problem Areas:     N/A     Justification for Skilled Services:     N/A    Treatment Plan:     Times Per Week: 1x (evaluation only).  Discharge recommendation: SNF    Goals:     N/A         Frances Pedraza OTR/L                      2025

## 2025-05-13 NOTE — PROGRESS NOTES
Guardianship paperwork provided by pts nephewSumeet. Paperwork given to Social Work by this nurse.

## 2025-05-13 NOTE — PLAN OF CARE
Problem: Discharge Planning  Goal: Discharge to home or other facility with appropriate resources  Outcome: Not Progressing     Problem: Neurosensory - Adult  Goal: Achieves maximal functionality and self care  Outcome: Not Progressing     Problem: Musculoskeletal - Adult  Goal: Return mobility to safest level of function  Outcome: Not Progressing  Goal: Return ADL status to a safe level of function  Outcome: Not Progressing     Problem: Gastrointestinal - Adult  Goal: Maintains adequate nutritional intake  Outcome: Not Progressing     Problem: Discharge Planning  Goal: Discharge to home or other facility with appropriate resources  Outcome: Not Progressing     Problem: Neurosensory - Adult  Goal: Achieves maximal functionality and self care  Outcome: Not Progressing     Problem: Musculoskeletal - Adult  Goal: Return mobility to safest level of function  Outcome: Not Progressing  Goal: Return ADL status to a safe level of function  Outcome: Not Progressing     Problem: Gastrointestinal - Adult  Goal: Maintains adequate nutritional intake  Outcome: Not Progressing

## 2025-05-13 NOTE — PROGRESS NOTES
Rn case manager and SW called to pt's guardian, Sumeet Forte to complete assessment and discharge plan with him. Pt is a 89 year old  female admitted for pneumonia. Pt lives alone in her home in [place] and has private caregivers coming into assist her and guardian is working to hire more so that pt has around the clock care. Pt has a front wheeled walker, wheelchair and BSC to use at home. Pt relies on her caregivers and family to transport her to appointments.     Pt is a DNRCC and follows with Dr Ahmadi as PCP at [facility]. Sumeet was made guardian of pt at the end of April and states that she also has advance directives at her 's office and he plans to talk with them and see what her wishes were. Pt's  calls MAGDALENO and will send pt advance directive documents over for her chart. ACP note completed with Sumeet. Sumeet reports that pt medications are covered well by insurance. Sumeet reports getting her to take the medications correctly has been a chore and she does not eat well at home either.     Discussed with Sumeet possibility of pt needing rehab and Sumeet is in agreement to this as it will allow him more time to make arrangements at home for her. Discussed options for rehab and Sumeet to think about this and talk with  and let SW know choice for facility. MAGDALENO following and will assist with arrangements for SNF care. Vesta Bradley, MSW, LSW 5/13/2025

## 2025-05-13 NOTE — H&P
History & Physical    Patient:  Nissa Sawyer  YOB: 1930  Date of Service: 5/13/2025  MRN: [id number]   Acct:   [id number]   Primary Care Physician: Fitz Ahmadi MD    Chief Complaint:   Chief Complaint   Patient presents with    Illness     Vomiting, diarrhea, weakness for past 2-3 days. Per report from [facility], patient unable to walk and very weak.        History of Present Illness:     History obtained from chart review and unobtainable from patient due to mental status.    The patient is a 89 y.o. female with history of hypertension, iron deficiency anemia, CVA, aortic stenosis, advanced CKD, vascular dementia, hypothyroidism presented to the emergency room by EMS for evaluation of nausea, vomiting diarrhea, generalized weakness for the past 2-3 days.  Patient is confused and unable to provide me with history.  Apparently she has been congested for several days and progressively became weak to the point that she was not able to ambulate at home.  Her grandson called EMS to bring the patient to the emergency room for evaluation.  Workup in the emergency room revealed temperature of 98.1, heart rate 77, respirations 18, /67, 97% on room air.  BMP revealed sodium 136, potassium 4.7, CO2 18, BUN 76, creatinine 3.1, calcium 9.4, glucose 91.  LFTs revealed albumin 3.4, alk phos 105, ALT 30, AST 82.  WBC count was elevated 12.3, H&H 13.0/37.7, platelets 211.  Influenza A and B were negative.  COVID was negative, urinalysis revealed 3+ leukocyte esterase,  WBCs, 4+ bacteria.  Chest x-ray revealed:    IMPRESSION:  1. Multilobar pneumonia in the right lung with small right effusion. Recommend   clinical and radiographic follow-up to resolution.  2. Vague 2 cm opacity in the left mid lung that is new from 3/19/2025,   presumably an inflammatory nodule or small area of pneumonia, but recommend   attention on follow-up studies to document resolution.  3. Hiatal hernia.    Patient was  available immediate resources to obtain, update, or review the patient's current medications (including all prescriptions, over-the-counter products, herbals, cannabis / cannabidiol products, vitamin / mineral / dietary (nutritional) supplements).  (Satisfies MIPS Performance)  If Yes, Stop Here  ( )  The patient is not eligible for medication reconciliation; the patient is in an emergent medical situation where delaying treatment would jeopardize the patient's health.  (MIPS Performance exception / exclusion)  ( )  I did not confirm, update or review the patient's current list of medications today.  (Does not satisfy MIPS Performance)              Advanced Care Plan    ( x)  I confirmed that the patient's Advanced Care Plan is present, code status documented, or surrogate decision maker is listed in the patient's medical record.  ( )  The patient's advanced care plan is not present because:  (select)   ( ) I confirmed today that the patient does not wish or was not able to name a surrogate decision maker or provide an Advance Care Plan.   ( ) Hospice care is currently being provided or has been provided this calender year.  ( )  I did not confirm today the presence of an Advance Care Plan or surrogate decision maker documented within the patient's medical record. (Does not satisfy MIPS performance).            Wilberto Solorzano MD, MD  Admitting Hospitalist

## 2025-05-13 NOTE — ACP (ADVANCE CARE PLANNING)
Advance Care Planning     Advance Care Planning Activator (Inpatient)  Conversation Note      Date of ACP Conversation: 5/13/2025     Conversation Conducted with: Healthcare Decision Maker/ Guardian : Sumeet Forte    ACP Activator: Vesta Bradley, MSW, LSW    {When Decision Maker makes decisions on behalf of the incapacitated patient: Decision Maker is asked to consider and make decisions based on patient values, known preferences, or best interests.     Health Care Decision Maker:     Current Designated Health Care Decision Maker:     Primary Decision Maker: Sumeet Forte - Niece/Nephew - [phone]  Click here to complete Healthcare Decision Makers including section of the Healthcare Decision Maker Relationship (ie \"Primary\")  Today we documented Decision Maker(s) consistent with ACP documents on file. Pt has a guardian, legal document on paper chart.     Care Preferences    Ventilation:  Sumeet would like to talk with pt's  regarding what wishes she had expressed to him prior to him becoming her guardian and will notify of such at a later time.       Resuscitation  \"CPR works best to restart the heart when there is a sudden event, like a heart attack, in someone who is otherwise healthy. Unfortunately, CPR does not typically restart the heart for people who have serious health conditions or who are very sick.\"    \"In the event your heart stopped as a result of an underlying serious health condition, would you want attempts to be made to restart your heart (answer \"yes\" for attempt to resuscitate) or would you prefer a natural death (answer \"no\" for do not attempt to resuscitate)?\" no       [x] Yes   [] No   Educated Patient / Decision Maker regarding differences between Advance Directives and portable DNR orders.    Length of ACP Conversation in minutes:  5    Conversation Outcomes:  ACP discussion completed and Existing advance directive reviewed with patient; no changes to patient's

## 2025-05-13 NOTE — PROGRESS NOTES
Pt requires assistance with eating breakfast. Pt visibly weak in upper extremities. Pt eats 50% of breakfast. Denture and mouth care provided. Dentures with a thick build up and black in color. Dentures remain soaking at this time.

## 2025-05-13 NOTE — THERAPY EVALUATION
[facility]  Physical Therapy  Evaluation  Date: 2025  Patient Name: Nissa Sawyer        MRN: [id number]    : 1930  (89 y.o.)  Gender: female   Referring Practitioner: Dr. Solorzano  Diagnosis: Pnuemonia  Additional Pertinent Hx: Patient admitted with progressive weakness and inability to amb at home and found to have pnuemonia and UTI. Referred to PT for functional mobility.  Patient poor historian due to dementia; family cares for patient however living situation/condition questionable   Past Medical History:   Diagnosis Date    Cerebral artery occlusion with cerebral infarction (HCC)     Hypertension     Thyroid disease      Past Surgical History:   Procedure Laterality Date    LEG SURGERY Right     [facility] [employer] [facility]       Restrictions         Subjective         Pain Level: 0    Orientation       Home Living / Prior Level of Function  Social/Functional History  Lives With: Alone  Hearing  Hearing: Exceptions to WFL  Hearing Exceptions: Hard of hearing/hearing concerns    Objective                    PROM LUE (degrees)  LUE General PROM: Limited shoulder flexion below 60 deg; patient with contractures of fingers and missing thumb following a traumatic work injury 50 years ago  Strength RLE  Comment: Generally 3/5  Strength LLE  Comment: Generally 3/5                Supine to Sit: Moderate assistance  Sit to Stand: Partial/Moderate assistance (x 2)  Bed to Chair: Partial/Moderate assistance (x2 for safety)              Balance  Sitting - Static: Fair, +  Standing - Static: Fair    Assessment  Activity Tolerance: Patient tolerated evaluation without incident   Chart Reviewed: Yes  Assessment: Patient admitted with progressive weakness and inability to amb associated with pnuemonia and UTI.   Patient [place] with a history of dementia and poor historian.   Patient very kypotic and forward flexed in standing.  Required mod assist supine to sit and 2 mod assist bed to chair with wh walker..  Recommend ECF

## 2025-05-13 NOTE — PLAN OF CARE
[facility]  Inpatient Physical Therapy  Plan of Care  Date: 2025  Patient Name: Nissa Sawyer        : 1930  (89 y.o.)  Referring Practitioner: Dr. Solorzano  Admission Date: 2025  Referral Date : 25  Diagnosis: Pnuemonia  Treatment Diagnosis: Weakness; difficulty with amb  PT Orders Received and Evaluation Complete  Identified Problem Areas:  Therapy Prognosis: Fair    Justification for Skilled Services:  [] Reduce Falls   [x] Improve Ambulation  []  Complete Daily Tasks Safely   [] Improve Balance   [] Improve LE strength  []  Return to Prior Level of Function  [x] Improve Functional Mobility   []  Family/Caregiver Education  [x] Patient Education: []Assistive Devices []Home Exercise Program and Progression    Plan  Frequency: 1-2x/day Daily M-F, 1x/day Sat-Sun    Duration: 5 days  [] Modalities:  [x] Therapeutic Exercise   [x] Gait Training  [x] Therapeutic Activity    [] Home Safety Evaluation         [] Massage                        [] Neuromuscular Re-education [] Back Education             [x] Patient Education [] Home Exercise Program  Discharge Recommendations: Continue to assess pending progress, Subacute/Skilled Nursing Facility    Rehab Potential:  []  Good [x] Fair   []  Poor    Goals  Short Term Goals  Time Frame for Short Term Goals: 5 days - expires 25  Short Term Goal 1: Transfer supine to sitting at bedside with min assist  Short Term Goal 2: Transfer bed to [facility] with wh walker and min assist for ADLs  Short Term Goal 3: Ambulate bed to chair with wh walker and min assist x 2    Long Term Goals  Long Term Goal 1: Continue to assess status and make recommendation; patient would benefit from ECF placement    Upon Swingbed Transfer this Plan of Care will be followed until revision is complete.    ELBA PAULINO, PT,    Date: 2025

## 2025-05-13 NOTE — PROGRESS NOTES
Update provided to pts nephewSumeet via phone call. Sumeet reports he has recently been appointed as the pts guardian. This nurse requests documentation of such. Sumeet reports that patient has been \"living alone but private caregivers have been hired recently to help.\" Sumeet reports that patient uses a walker to ambulate, stand by assist. Sumeet reports, \"wanting to keep pt out of the nursing home but if that's what is needed for a little bit to get stronger then that is ok.\"

## 2025-05-13 NOTE — CARE COORDINATION
Case Management Assessment  Initial Evaluation    Date/Time of Evaluation: 5/13/2025 2:11 PM  Assessment Completed by: Adrian Singh RN    If patient is discharged prior to next notation, then this note serves as note for discharge by case management.    Patient Name: Nissa Sawyer                   YOB: 1930  Diagnosis: Generalized weakness [R53.1]  Community acquired bacterial pneumonia [J15.9]  Urinary tract infection without hematuria, site unspecified [N39.0]  Pneumonia of right lower lobe due to infectious organism [J18.9]  Acute renal failure with acute tubular necrosis superimposed on stage 2 chronic kidney disease [N17.0, N18.2]                   Date / Time: 5/12/2025 12:36 PM    Patient Admission Status: Inpatient   Readmission Risk (Low < 19, Mod (19-27), High > 27): Readmission Risk Score: 14.7    Current PCP: Fitz Ahmaid MD  PCP verified by CM? (P) Yes (Dr Ahmadi)    Chart Reviewed: Yes      History Provided by: (P) Other (see comment) (legal guardian-Sumeet Forte)  Patient Orientation: (P) Other (see comment) (dementia)    Patient Cognition: (P) Dementia / Early Alzheimer's    Hospitalization in the last 30 days (Readmission):  No    If yes, Readmission Assessment in CM Navigator will be completed.    Advance Directives:      Code Status: DNR-CC   Patient's Primary Decision Maker is: (P) Named in Scanned ACP Document    Primary Decision Maker: Sumeet Torres - Niece/Nephew - [phone]    Discharge Planning:    Patient lives with: (P) Alone Type of Home: (P) House  Primary Care Giver: (P) Private caregiver  Patient Support Systems include: (P) Other (Comment) (legal guardian, private duty caregivers)   Current Financial resources: (P) Medicare  Current community resources: (P) Legal Services  Current services prior to admission: (P) Durable Medical Equipment, Private Duty Homecare            Current DME: (P) Wheelchair, Walker, Bedside Commode            Type of Home Care  shower chair. He states the home is in need of \"a good cleaning and some home repairs\". He states he became her legal guardian the end of April and recently had surgery. Patient has dementia and no longer drives, he or care givers provide transportation. Medication cost is covered by insurance at this time. PCP is Dr. Ahmadi at [facility]. He prefers patient discharge to local SNF to allow him time to clean the home up and obtain additional equipment. He states he will review local facilities and let staff know. He denies questions at this time. He denies food, housing, financial, or transportation insecurities at this time.     The Plan for Transition of Care is related to the following treatment goals of Generalized weakness [R53.1]  Community acquired bacterial pneumonia [J15.9]  Urinary tract infection without hematuria, site unspecified [N39.0]  Pneumonia of right lower lobe due to infectious organism [J18.9]  Acute renal failure with acute tubular necrosis superimposed on stage 2 chronic kidney disease [N17.0, N18.2]    IF APPLICABLE: The Patient and/or patient representative Nissa and her family were provided with a choice of provider and agrees with the discharge plan. Freedom of choice list with basic dialogue that supports the patient's individualized plan of care/goals and shares the quality data associated with the providers was provided to: (P) Patient Representative   Patient Representative Name: (P) Sumeet Forte-legal guardian     The Patient and/or Patient Representative Agree with the Discharge Plan? (P) Yes    Adrian Singh RN  Case Management Department  Ph: [phone] Fax: [phone]

## 2025-05-14 PROBLEM — E43 SEVERE MALNUTRITION: Status: ACTIVE | Noted: 2025-05-14

## 2025-05-14 LAB
ANION GAP SERPL CALCULATED.3IONS-SCNC: 14 MMOL/L (ref 9–17)
BASOPHILS # BLD: 0.01 K/UL (ref 0–0.2)
BASOPHILS NFR BLD: 0 % (ref 0–2)
BUN SERPL-MCNC: 88 MG/DL (ref 8–23)
CALCIUM SERPL-MCNC: 8.2 MG/DL (ref 8.6–10.4)
CHLORIDE SERPL-SCNC: 109 MMOL/L (ref 98–107)
CO2 SERPL-SCNC: 17 MMOL/L (ref 20–31)
CREAT SERPL-MCNC: 2.9 MG/DL (ref 0.5–0.9)
EOSINOPHIL # BLD: 0.1 K/UL (ref 0–0.4)
EOSINOPHILS RELATIVE PERCENT: 2 % (ref 0–5)
ERYTHROCYTE [DISTWIDTH] IN BLOOD BY AUTOMATED COUNT: 13.9 % (ref 12.1–15.2)
GFR, ESTIMATED: 15 ML/MIN/1.73M2
GLUCOSE SERPL-MCNC: 80 MG/DL (ref 70–99)
HCT VFR BLD AUTO: 27 % (ref 36–46)
HGB BLD-MCNC: 9.5 G/DL (ref 12–16)
IMM GRANULOCYTES # BLD AUTO: 0.06 K/UL (ref 0–0.3)
IMM GRANULOCYTES NFR BLD: 1 % (ref 0–5)
LYMPHOCYTES NFR BLD: 0.9 K/UL (ref 1–4.8)
LYMPHOCYTES RELATIVE PERCENT: 16 % (ref 15–40)
MCH RBC QN AUTO: 31.4 PG (ref 26–34)
MCHC RBC AUTO-ENTMCNC: 35.2 G/DL (ref 31–37)
MCV RBC AUTO: 89.1 FL (ref 80–100)
MICROORGANISM SPEC CULT: ABNORMAL
MONOCYTES NFR BLD: 0.39 K/UL (ref 0–1)
MONOCYTES NFR BLD: 7 % (ref 4–8)
NEUTROPHILS NFR BLD: 73 % (ref 47–75)
NEUTS SEG NFR BLD: 4.04 K/UL (ref 2.5–7)
PLATELET # BLD AUTO: 176 K/UL (ref 140–450)
PMV BLD AUTO: 11.1 FL (ref 6–12)
POTASSIUM SERPL-SCNC: 4 MMOL/L (ref 3.7–5.3)
RBC # BLD AUTO: 3.03 M/UL (ref 4–5.2)
SODIUM SERPL-SCNC: 140 MMOL/L (ref 135–144)
SPECIMEN DESCRIPTION: ABNORMAL
WBC OTHER # BLD: 5.5 K/UL (ref 3.5–11)

## 2025-05-14 PROCEDURE — 94761 N-INVAS EAR/PLS OXIMETRY MLT: CPT

## 2025-05-14 PROCEDURE — 80048 BASIC METABOLIC PNL TOTAL CA: CPT

## 2025-05-14 PROCEDURE — 2500000003 HC RX 250 WO HCPCS: Performed by: INTERNAL MEDICINE

## 2025-05-14 PROCEDURE — 2580000003 HC RX 258: Performed by: INTERNAL MEDICINE

## 2025-05-14 PROCEDURE — 36415 COLL VENOUS BLD VENIPUNCTURE: CPT

## 2025-05-14 PROCEDURE — 6360000002 HC RX W HCPCS: Performed by: INTERNAL MEDICINE

## 2025-05-14 PROCEDURE — 97110 THERAPEUTIC EXERCISES: CPT

## 2025-05-14 PROCEDURE — 85025 COMPLETE CBC W/AUTO DIFF WBC: CPT

## 2025-05-14 PROCEDURE — 1200000000 HC SEMI PRIVATE

## 2025-05-14 PROCEDURE — 6370000000 HC RX 637 (ALT 250 FOR IP): Performed by: INTERNAL MEDICINE

## 2025-05-14 PROCEDURE — 97116 GAIT TRAINING THERAPY: CPT

## 2025-05-14 RX ORDER — TRAMADOL HYDROCHLORIDE 50 MG/1
50 TABLET ORAL EVERY 6 HOURS PRN
Status: DISCONTINUED | OUTPATIENT
Start: 2025-05-14 | End: 2025-05-14

## 2025-05-14 RX ORDER — PREDNISONE 20 MG/1
20 TABLET ORAL DAILY
Status: DISCONTINUED | OUTPATIENT
Start: 2025-05-14 | End: 2025-05-15 | Stop reason: HOSPADM

## 2025-05-14 RX ADMIN — WATER 1000 MG: 1 INJECTION INTRAMUSCULAR; INTRAVENOUS; SUBCUTANEOUS at 14:33

## 2025-05-14 RX ADMIN — LEVOTHYROXINE SODIUM 25 MCG: 0.03 TABLET ORAL at 05:34

## 2025-05-14 RX ADMIN — CLOPIDOGREL BISULFATE 75 MG: 75 TABLET, FILM COATED ORAL at 08:20

## 2025-05-14 RX ADMIN — SODIUM CHLORIDE: 0.9 INJECTION, SOLUTION INTRAVENOUS at 10:12

## 2025-05-14 RX ADMIN — Medication 1000 UNITS: at 08:20

## 2025-05-14 RX ADMIN — AZITHROMYCIN DIHYDRATE 500 MG: 250 TABLET ORAL at 15:26

## 2025-05-14 RX ADMIN — FERROUS SULFATE TAB 325 MG (65 MG ELEMENTAL FE) 325 MG: 325 (65 FE) TAB at 20:08

## 2025-05-14 RX ADMIN — PREDNISONE 20 MG: 20 TABLET ORAL at 10:14

## 2025-05-14 RX ADMIN — SODIUM CHLORIDE, PRESERVATIVE FREE 10 ML: 5 INJECTION INTRAVENOUS at 21:13

## 2025-05-14 RX ADMIN — HEPARIN SODIUM 5000 UNITS: 5000 INJECTION INTRAVENOUS; SUBCUTANEOUS at 20:08

## 2025-05-14 RX ADMIN — HEPARIN SODIUM 5000 UNITS: 5000 INJECTION INTRAVENOUS; SUBCUTANEOUS at 08:20

## 2025-05-14 RX ADMIN — FERROUS SULFATE TAB 325 MG (65 MG ELEMENTAL FE) 325 MG: 325 (65 FE) TAB at 08:20

## 2025-05-14 RX ADMIN — Medication 1000 MCG: at 08:20

## 2025-05-14 ASSESSMENT — PAIN SCALES - GENERAL
PAINLEVEL_OUTOF10: 0
PAINLEVEL_OUTOF10: 0

## 2025-05-14 NOTE — PROGRESS NOTES
Hospitalist Progress Note  5/14/2025 9:17 AM  Subjective:   Admit Date: 5/12/2025  PCP: Fitz Ahmadi MD    Interval History:     Patient is up in chair, states feels sick.  She denies having pain.  She continues to be very weak.  Oral intake is poor.  She had no nausea, no vomiting.    Diet: ADULT DIET; Regular  ADULT ORAL NUTRITION SUPPLEMENT; Breakfast, Lunch, Dinner; Clear Liquid Oral Supplement  Medications:   Scheduled Meds:   Vitamin D  1,000 Units Oral Daily with breakfast    clopidogrel  75 mg Oral Daily    ferrous sulfate  325 mg Oral BID    levothyroxine  25 mcg Oral Daily    vitamin B-12  1,000 mcg Oral Daily    sodium chloride flush  5-40 mL IntraVENous 2 times per day    heparin (porcine)  5,000 Units SubCUTAneous BID    cefTRIAXone (ROCEPHIN) IV  1,000 mg IntraVENous Q24H    And    azithromycin  500 mg Oral Q24H     Continuous Infusions:   sodium chloride      sodium chloride 50 mL/hr at 05/14/25 0655     PRN Medications: sodium chloride flush, sodium chloride, ondansetron **OR** ondansetron, polyethylene glycol, acetaminophen    Objective:   Vitals: /81   Pulse 68   Temp 98 °F (36.7 °C) (Temporal)   Resp 18   Ht 1.524 m (5')   Wt 40.6 kg (89 lb 8.1 oz)   SpO2 97%   BMI 17.48 kg/m²   BMI: Body mass index is 17.48 kg/m².    CBC:   Recent Labs     05/12/25  1310 05/13/25  0405 05/14/25  0425   WBC 12.3* 9.0 5.5   HGB 13.0 9.9* 9.5*    181 176     BMP:    Recent Labs     05/12/25  1310 05/14/25  0425    140   K 4.7 4.0   CL 98 109*   CO2 18* 17*   BUN 76* 88*   CREATININE 3.1* 2.9*   GLUCOSE 91 80     Hepatic:   Recent Labs     05/12/25  1310   AST 82*   ALT 30   BILITOT 0.4   ALKPHOS 105*             Physical Exam:  General Appearance: Up in chair, answers to some of my questions.  In no acute distress  Cardiovascular: normal rate, regular rhythm, normal S1 and S2, 3/6 systolic murmur  Pulmonary/Chest: Bilateral wheezes, no rales or rhonchi, diminished air movement, no  respiratory distress  Abdomen: soft, non-tender, non-distended, normal bowel sounds  Extremities: no cyanosis, clubbing or edema  Skin: warm and dry, no rash   Neurological: Confused      Medical Decision Making (MDM) Data:  Number and Complexity of Problems  Differential Diagnosis: Dehydration, viral illness, UTI, pneumonia  Conditions and Status: Stable, improving     Initial Problem(s) still undergoing workup/evaluation: [Y/N]  New Problems Identified and Addressed this DOS: [-]  Changes/Updates to Differential Diagnosis this DOS: [-]     Communication  Management/test interpretation discussed with external clinician/appropriate source this DOS: [Y/N]  External Clinician (physicians/APCs not in same group/specialty, dieticians,  pharmacists, PT, OT, speech therapists, psychologists): [-] ER physician Dr. Nelson  Appropriate Source (, , , , teacher): [-]  Shared decision making with patient/family this DOS: [-]     MDM Data  Historians other than the patient:  -  External records reviewed:  -  My X-ray interpretation: -  My EKG interpretation:   My CT interpretation:  -  My Ultrasound interpretation:  -  Decision rules / scores evaluated:  -  Management / test interpretation discussed with:   -           Assessment and Plan      1.  Multilobar pneumonia  - continue on IV Rocephin, Zithromax.  Respiratory panel positive for parainfluenza.  Legionella and strep pneumonia antigens pending, blood cultures pending.  Will add small dose prednisone due to wheezing  2.  UTI secondary to E. coli- continue on IV Rocephin  3.  Acute on CKD stage IIIb most likely secondary to dehydration from nausea vomiting -started on IV fluids, renal function improving  4.  Left midlung 2 cm opacity -follow-up imaging recommended  5.  Chronic anemia iron deficiency-on iron replacement  6.  Aortic valve stenosis, moderate.  Patient declined cardiology workup in the past according to PCPs

## 2025-05-14 NOTE — PROGRESS NOTES
Phone: [phone] [facility]  Date: 2025  Fax: [phone]      Physical Therapy    Daily Note    Patient Name: Nissa Sawyer      : 1930  (89 y.o.)  MRN: [id number]       Assessment: Pt is in bed upon arrival.She is agreeable to participate.  She transfers to EOB with MIN A.  She demonstrates good sitting balance.  She transfers to stand with MIN A +2.  Pt extremely kyphotic. She req assist to place left hand on walker. She amb to chair x 5 feet  with +2 MIN assist. Pt with safety concerns d/t trying to sit prior to getting to chair. Once in chair she is moaning but denies pain. She is agreeable to attempt ex. Pt fatigues rapidly and only tomasz 3 reps of LAQ, 5 reps pillow squeeze and is too fatigued to perform ankle pumps. Pt positioned for comfort in reclined position in bedside chair.  arrives  to examine pt  at end of session.      Call light in reach, Use of Gait belt, Nurse Notified, and Left in chair  Time In: 915  Time Out: 938  Timed Coded Minutes: 23  Total Treatment Time: 23       Exercises:  See Flowsheets    Plan  Cont Per Plan Of Care    Goals  Short Term Goals  Time Frame for Short Term Goals: 5 days - expires 25  Short Term Goal 1: Transfer supine to sitting at bedside with min assist  Short Term Goal 2: Transfer bed to BSC with wh walker and min assist for ADLs  Short Term Goal 3: Ambulate bed to chair with wh walker and min assist x 2          Long Term Goals     Long Term Goal 1: Continue to assess status and make recommendation; patient would benefit from ECF placement                Amanda Leslie PTA  Date: 2025

## 2025-05-14 NOTE — PROGRESS NOTES
Comprehensive Nutrition Assessment    Type and Reason for Visit:  Initial, Positive nutrition screen (MST 1)    Nutrition Recommendations/Plan:   Continue current diet.   Start ensure clear  TID with meals.      Malnutrition Assessment:  Malnutrition Status:  Severe malnutrition (05/14/25 0841)    Context:  Acute Illness     Findings of the 6 clinical characteristics of malnutrition:  Energy Intake:  50% or less of estimated energy requirements for 5 or more days  Weight Loss:  Mild weight loss (14.5% x 9 months)     Body Fat Loss:  Moderate body fat loss Orbital, Fat Overlying Ribs, Buccal region   Muscle Mass Loss:  Moderate muscle mass loss Temples (temporalis), Clavicles (pectoralis & deltoids)  Fluid Accumulation:  No fluid accumulation     Strength:  Not Performed    Nutrition Assessment:    Severe malnutrition (acute) r/t inadequate protein/energy intakes aeb weight loss 15.2#/14.5% x 9 months, poor PO-typically eating once daily, vomiting, diarrhea. Recommend probiotic due to ATB therapy. Renal indices elevated, BUN 88/Cr 2.9. GFR 15. Pt reports liking strawberry flavors, drank juice at breakfast and ate 51-75%. States it did not taste right.   In lieu of renal indices will add ensure clear and progress to strawberry ensure enlive as renal indices improve. Pt encouraged to choose cheese, whole milk, whole milk yogurt, peanut butter for calorie density when having meals or snacks.    Nutrition Related Findings:    active bowel sounds, no edema Wound Type: None       Current Nutrition Intake & Therapies:    Average Meal Intake: 51-75%  Average Supplements Intake: None Ordered  ADULT DIET; Regular    Anthropometric Measures:  Height: 152.4 cm (5')  Ideal Body Weight (IBW): 100 lbs (45 kg)    Admission Body Weight: 39.3 kg (86 lb 10 oz)  Current Body Weight: 40.6 kg (89 lb 8.1 oz), 89.5 % IBW. Weight Source: Bed scale  Current BMI (kg/m2): 17.5  Usual Body Weight: 47.6 kg (105 lb) (8/1/24)     % Weight

## 2025-05-14 NOTE — DISCHARGE INSTR - COC
Continuity of Care Form    Patient Name: Nissa Sawyer   :  1930  MRN:  [id number]    Admit date:  2025  Discharge date:  5/15/25    Code Status Order: DNR-CC   Advance Directives:     Admitting Physician:  Wilberto Solorzano MD  PCP: Fitz Ahmadi MD    Discharging Nurse: PHYLICIA Shields  Discharging Hospital Unit/Room#: 0257/0257-01  Discharging Unit Phone Number: [phone]    Emergency Contact:   Extended Emergency Contact Information  Primary Emergency Contact: Arcenio Fortetiffanygalina   [facility]  Home Phone: [phone]  Relation: Brother/Sister  Secondary Emergency Contact: Yariel Geiger  Mobile Phone: [phone]  Relation: Niece/Nephew    Past Surgical History:  Past Surgical History:   Procedure Laterality Date    LEG SURGERY Right     [facility] [employer] [facility]       Immunization History:     There is no immunization history on file for this patient.    Active Problems:  Patient Active Problem List   Diagnosis Code    Acute cystitis without hematuria N30.00    Community acquired bacterial pneumonia J15.9    Severe malnutrition E43       Isolation/Infection:   Isolation            Contact          Patient Infection Status        Infection Onset Added Last Indicated Last Indicated By Review Planned Expiration    Parainfluenza 25 Respiratory Panel, Molecular, with COVID-19 (Restricted: peds pts or suitable admitted adults) 25 history                         Nurse Assessment:  Last Vital Signs: /81   Pulse 81   Temp 98 °F (36.7 °C) (Temporal)   Resp 17   Ht 1.524 m (5')   Wt 40.6 kg (89 lb 8.1 oz)   SpO2 96%   BMI 17.48 kg/m²     Last documented pain score (0-10 scale): Pain Level: 0  Last Weight:   Wt Readings from Last 1 Encounters:   25 40.6 kg (89 lb 8.1 oz)     Mental Status:  disoriented, alert, and thought processes intact    IV Access:  - None    Nursing Mobility/ADLs:  Walking   Assisted  Transfer  Assisted  Bathing  Assisted  Dressing

## 2025-05-14 NOTE — PROGRESS NOTES
SW spoke with guardian this morning and he would prefer pt to go to [facility] or [facility] depending on bed availability. SW made referrals to both and will await response if they can accept. [facility] following. AUGUSTIN Black, RAMANW 5/14/2025     Benjamin returns SW call and requested a few more pieces of information and because she has dementia their policy states they will need to do an on site visit with her and they plan to do that this afternoon. Benjamin will also contact her guardian for any other needed info. MAGDAL[facility] following. AUGUSTIN Black, SONALI 5/14/2025     Benjamin has accepted pt for SNF for transfer tomorrow. [facility] will arrange transport. Pt will need covid test on day of discharge faxed to facility with discharge summary and AVS. Guardian called and message left that [facility] had accepted and plan for discharge tomorrow.  AUGUSTIN Black, SONALI 5/14/2025

## 2025-05-14 NOTE — CARE COORDINATION
05/14/25 1435   IMM Letter   IMM Letter given to Patient/Family/Significant other/Guardian/POA/by: second IMM letter given verbally to legal guardian-Sumeet Forte per Adrian Singh RN   IMM Letter date given: 05/14/25   IMM Letter time given: 0900

## 2025-05-15 VITALS
HEART RATE: 82 BPM | RESPIRATION RATE: 18 BRPM | HEIGHT: 60 IN | TEMPERATURE: 97.7 F | BODY MASS INDEX: 18.31 KG/M2 | OXYGEN SATURATION: 98 % | DIASTOLIC BLOOD PRESSURE: 74 MMHG | WEIGHT: 93.25 LBS | SYSTOLIC BLOOD PRESSURE: 178 MMHG

## 2025-05-15 LAB
ANION GAP SERPL CALCULATED.3IONS-SCNC: 13 MMOL/L (ref 9–17)
BUN SERPL-MCNC: 81 MG/DL (ref 8–23)
CALCIUM SERPL-MCNC: 8.7 MG/DL (ref 8.6–10.4)
CHLORIDE SERPL-SCNC: 110 MMOL/L (ref 98–107)
CO2 SERPL-SCNC: 18 MMOL/L (ref 20–31)
CREAT SERPL-MCNC: 2.6 MG/DL (ref 0.5–0.9)
GFR, ESTIMATED: 17 ML/MIN/1.73M2
GLUCOSE SERPL-MCNC: 129 MG/DL (ref 70–99)
POTASSIUM SERPL-SCNC: 4.1 MMOL/L (ref 3.7–5.3)
SARS-COV-2 RDRP RESP QL NAA+PROBE: NOT DETECTED
SODIUM SERPL-SCNC: 141 MMOL/L (ref 135–144)
SPECIMEN DESCRIPTION: NORMAL

## 2025-05-15 PROCEDURE — 6370000000 HC RX 637 (ALT 250 FOR IP): Performed by: INTERNAL MEDICINE

## 2025-05-15 PROCEDURE — 6360000002 HC RX W HCPCS: Performed by: INTERNAL MEDICINE

## 2025-05-15 PROCEDURE — 94761 N-INVAS EAR/PLS OXIMETRY MLT: CPT

## 2025-05-15 PROCEDURE — 80048 BASIC METABOLIC PNL TOTAL CA: CPT

## 2025-05-15 PROCEDURE — 87635 SARS-COV-2 COVID-19 AMP PRB: CPT

## 2025-05-15 PROCEDURE — 2500000003 HC RX 250 WO HCPCS: Performed by: INTERNAL MEDICINE

## 2025-05-15 PROCEDURE — 36415 COLL VENOUS BLD VENIPUNCTURE: CPT

## 2025-05-15 RX ORDER — HYDRALAZINE HYDROCHLORIDE 25 MG/1
25 TABLET, FILM COATED ORAL EVERY 8 HOURS SCHEDULED
Status: DISCONTINUED | OUTPATIENT
Start: 2025-05-15 | End: 2025-05-15 | Stop reason: HOSPADM

## 2025-05-15 RX ORDER — POLYETHYLENE GLYCOL 3350 17 G/17G
17 POWDER, FOR SOLUTION ORAL DAILY PRN
DISCHARGE
Start: 2025-05-15 | End: 2025-06-14

## 2025-05-15 RX ORDER — HYDRALAZINE HYDROCHLORIDE 20 MG/ML
10 INJECTION INTRAMUSCULAR; INTRAVENOUS ONCE
Status: COMPLETED | OUTPATIENT
Start: 2025-05-15 | End: 2025-05-15

## 2025-05-15 RX ORDER — VITAMIN B COMPLEX
1000 TABLET ORAL
DISCHARGE
Start: 2025-05-15

## 2025-05-15 RX ORDER — PREDNISONE 20 MG/1
20 TABLET ORAL DAILY
Qty: 5 TABLET | Refills: 0 | DISCHARGE
Start: 2025-05-15 | End: 2025-05-20

## 2025-05-15 RX ORDER — HYDRALAZINE HYDROCHLORIDE 25 MG/1
25 TABLET, FILM COATED ORAL EVERY 8 HOURS SCHEDULED
DISCHARGE
Start: 2025-05-15

## 2025-05-15 RX ORDER — ONDANSETRON 4 MG/1
4 TABLET, ORALLY DISINTEGRATING ORAL EVERY 8 HOURS PRN
DISCHARGE
Start: 2025-05-15

## 2025-05-15 RX ADMIN — LEVOTHYROXINE SODIUM 25 MCG: 0.03 TABLET ORAL at 05:16

## 2025-05-15 RX ADMIN — HYDRALAZINE HYDROCHLORIDE 10 MG: 20 INJECTION, SOLUTION INTRAMUSCULAR; INTRAVENOUS at 08:09

## 2025-05-15 RX ADMIN — HYDRALAZINE HYDROCHLORIDE 25 MG: 25 TABLET ORAL at 05:56

## 2025-05-15 ASSESSMENT — PAIN SCALES - GENERAL: PAINLEVEL_OUTOF10: 0

## 2025-05-15 NOTE — DISCHARGE SUMMARY
Hospitalist Discharge Summary    Patient:  Nissa Sawyer  YOB: 1930    MRN: [id number]   Acct: [id number]    Primary Care Physician: Fitz Ahmadi MD    Admit date:  5/12/2025    Discharge date:  5/15/2025       Discharge Diagnoses:   Multilobar pneumonia  2.  UTI secondary to E. Coli  3.  Acute on CKD stage IIIb  4.  Left midlung 2 cm opacity with follow-up imaging recommended  5.  Parainfluenza infection  6.  Chronic iron deficiency anemia  7.  Aortic valve stenosis  8.  Vascular dementia  9.  Hypothyroidism  10.  Severe malnutrition  11.  Generalized weakness  12.  Vitamin D deficiency    Discharge Medications:         Medication List        START taking these medications      amoxicillin-clavulanate 875-125 MG per tablet  Commonly known as: AUGMENTIN  Take 1 tablet by mouth 2 times daily for 7 days     lisinopril 5 MG tablet  Commonly known as: PRINIVIL;ZESTRIL     ondansetron 4 MG disintegrating tablet  Commonly known as: ZOFRAN-ODT  Take 1 tablet by mouth every 8 hours as needed for Nausea or Vomiting     pantoprazole 40 MG tablet  Commonly known as: PROTONIX     polyethylene glycol 17 g packet  Commonly known as: GLYCOLAX  Take 1 packet by mouth daily as needed for Constipation     predniSONE 20 MG tablet  Commonly known as: DELTASONE  Take 1 tablet by mouth daily for 5 days            CHANGE how you take these medications      hydrALAZINE 25 MG tablet  Commonly known as: APRESOLINE  Take 1 tablet by mouth every 8 hours  What changed: when to take this     Vitamin D 25 MCG (1000 UT) Tabs tablet  Commonly known as: CHOLECALCIFEROL  Take 1 tablet by mouth daily (with breakfast)  What changed:   medication strength  how much to take  when to take this            CONTINUE taking these medications      clopidogrel 75 MG tablet  Commonly known as: PLAVIX     Iron 325 (65 Fe) MG Tabs     levothyroxine 25 MCG tablet  Commonly known as: SYNTHROID     vitamin B-12 1000 MCG tablet  Commonly known

## 2025-05-15 NOTE — PROGRESS NOTES
Patient being discharged today to [facility].  Spoke with her nephew/guardian, Sumeet, to update him on time of transport.  Ambulance coordinated to pick Patient up at 10 a.m.  Sumeet reports that Patient is unhappy and wishes to be returning home.  Did speak to Patient and updated Sumeet on this as well.  Much reassurance and emotional support offered to him at this time.      Telephone voicemail message left for Admissions Coordinator at [facility] and discharge paperwork FAXED to facility including HENS, discharge summary, AVS/SUSAN.  Guardianship papers and Advanced Directives copied and included in packet to accompany Patient on admission.    Jessika Brown, SONALI  5/15/2025

## 2025-05-18 LAB
MICROORGANISM SPEC CULT: NORMAL
MICROORGANISM SPEC CULT: NORMAL
SERVICE CMNT-IMP: NORMAL
SERVICE CMNT-IMP: NORMAL
SPECIMEN DESCRIPTION: NORMAL
SPECIMEN DESCRIPTION: NORMAL

## 2025-05-26 ENCOUNTER — LAB REQUISITION (OUTPATIENT)
Dept: LAB | Facility: HOSPITAL | Age: 89
End: 2025-05-26
Payer: MEDICARE

## 2025-05-26 DIAGNOSIS — E86.0 DEHYDRATION: ICD-10-CM

## 2025-05-26 LAB
ANION GAP SERPL CALC-SCNC: 11 MMOL/L (ref 10–20)
BUN SERPL-MCNC: 51 MG/DL (ref 6–23)
CALCIUM SERPL-MCNC: 8.4 MG/DL (ref 8.6–10.3)
CHLORIDE SERPL-SCNC: 113 MMOL/L (ref 98–107)
CO2 SERPL-SCNC: 21 MMOL/L (ref 21–32)
CREAT SERPL-MCNC: 1.98 MG/DL (ref 0.5–1.05)
EGFRCR SERPLBLD CKD-EPI 2021: 23 ML/MIN/1.73M*2
ERYTHROCYTE [DISTWIDTH] IN BLOOD BY AUTOMATED COUNT: 15.6 % (ref 11.5–14.5)
GLUCOSE SERPL-MCNC: 93 MG/DL (ref 74–99)
HCT VFR BLD AUTO: 27.2 % (ref 36–46)
HGB BLD-MCNC: 8.8 G/DL (ref 12–16)
MCH RBC QN AUTO: 31.7 PG (ref 26–34)
MCHC RBC AUTO-ENTMCNC: 32.4 G/DL (ref 32–36)
MCV RBC AUTO: 98 FL (ref 80–100)
NRBC BLD-RTO: 0 /100 WBCS (ref 0–0)
PLATELET # BLD AUTO: 264 X10*3/UL (ref 150–450)
POTASSIUM SERPL-SCNC: 4.1 MMOL/L (ref 3.5–5.3)
RBC # BLD AUTO: 2.78 X10*6/UL (ref 4–5.2)
SODIUM SERPL-SCNC: 141 MMOL/L (ref 136–145)
WBC # BLD AUTO: 7.9 X10*3/UL (ref 4.4–11.3)

## 2025-05-26 PROCEDURE — 85027 COMPLETE CBC AUTOMATED: CPT | Mod: OUT | Performed by: INTERNAL MEDICINE

## 2025-05-26 PROCEDURE — 80048 BASIC METABOLIC PNL TOTAL CA: CPT | Mod: OUT | Performed by: INTERNAL MEDICINE

## 2025-05-30 ENCOUNTER — APPOINTMENT (OUTPATIENT)
Dept: CARDIOLOGY | Facility: HOSPITAL | Age: 89
DRG: 309 | End: 2025-05-30
Payer: MEDICARE

## 2025-05-30 ENCOUNTER — APPOINTMENT (OUTPATIENT)
Dept: RADIOLOGY | Facility: HOSPITAL | Age: 89
DRG: 309 | End: 2025-05-30
Payer: MEDICARE

## 2025-05-30 ENCOUNTER — HOSPITAL ENCOUNTER (INPATIENT)
Facility: HOSPITAL | Age: 89
LOS: 1 days | Discharge: NURSING, REHAB OR LONG-TERM CARE | DRG: 309 | End: 2025-06-01
Attending: EMERGENCY MEDICINE | Admitting: INTERNAL MEDICINE
Payer: MEDICARE

## 2025-05-30 DIAGNOSIS — I48.91 NEW ONSET ATRIAL FIBRILLATION (MULTI): Primary | ICD-10-CM

## 2025-05-30 DIAGNOSIS — I48.0 PAROXYSMAL ATRIAL FIBRILLATION (MULTI): ICD-10-CM

## 2025-05-30 DIAGNOSIS — J90 PLEURAL EFFUSION: ICD-10-CM

## 2025-05-30 DIAGNOSIS — I50.33 CHF (CONGESTIVE HEART FAILURE), NYHA CLASS I, ACUTE ON CHRONIC, DIASTOLIC: ICD-10-CM

## 2025-05-30 LAB
ALBUMIN SERPL BCP-MCNC: 3 G/DL (ref 3.4–5)
ALP SERPL-CCNC: 93 U/L (ref 33–136)
ALT SERPL W P-5'-P-CCNC: 13 U/L (ref 7–45)
ANION GAP SERPL CALC-SCNC: 11 MMOL/L (ref 10–20)
APPEARANCE UR: CLEAR
AST SERPL W P-5'-P-CCNC: 15 U/L (ref 9–39)
BASOPHILS # BLD AUTO: 0.08 X10*3/UL (ref 0–0.1)
BASOPHILS NFR BLD AUTO: 1.1 %
BILIRUB SERPL-MCNC: 0.3 MG/DL (ref 0–1.2)
BILIRUB UR STRIP.AUTO-MCNC: NEGATIVE MG/DL
BNP SERPL-MCNC: 756 PG/ML (ref 0–99)
BUN SERPL-MCNC: 45 MG/DL (ref 6–23)
CALCIUM SERPL-MCNC: 8.9 MG/DL (ref 8.6–10.3)
CARDIAC TROPONIN I PNL SERPL HS: 46 NG/L (ref 0–13)
CARDIAC TROPONIN I PNL SERPL HS: 51 NG/L (ref 0–13)
CHLORIDE SERPL-SCNC: 114 MMOL/L (ref 98–107)
CO2 SERPL-SCNC: 24 MMOL/L (ref 21–32)
COLOR UR: NORMAL
CREAT SERPL-MCNC: 1.91 MG/DL (ref 0.5–1.05)
EGFRCR SERPLBLD CKD-EPI 2021: 24 ML/MIN/1.73M*2
EOSINOPHIL # BLD AUTO: 0.16 X10*3/UL (ref 0–0.4)
EOSINOPHIL NFR BLD AUTO: 2.1 %
ERYTHROCYTE [DISTWIDTH] IN BLOOD BY AUTOMATED COUNT: 15.6 % (ref 11.5–14.5)
GLUCOSE SERPL-MCNC: 101 MG/DL (ref 74–99)
GLUCOSE UR STRIP.AUTO-MCNC: NORMAL MG/DL
HCT VFR BLD AUTO: 31.8 % (ref 36–46)
HGB BLD-MCNC: 10.1 G/DL (ref 12–16)
IMM GRANULOCYTES # BLD AUTO: 0.03 X10*3/UL (ref 0–0.5)
IMM GRANULOCYTES NFR BLD AUTO: 0.4 % (ref 0–0.9)
KETONES UR STRIP.AUTO-MCNC: NEGATIVE MG/DL
LEUKOCYTE ESTERASE UR QL STRIP.AUTO: NEGATIVE
LIPASE SERPL-CCNC: 16 U/L (ref 9–82)
LYMPHOCYTES # BLD AUTO: 1.1 X10*3/UL (ref 0.8–3)
LYMPHOCYTES NFR BLD AUTO: 14.6 %
MCH RBC QN AUTO: 31.7 PG (ref 26–34)
MCHC RBC AUTO-ENTMCNC: 31.8 G/DL (ref 32–36)
MCV RBC AUTO: 100 FL (ref 80–100)
MONOCYTES # BLD AUTO: 0.44 X10*3/UL (ref 0.05–0.8)
MONOCYTES NFR BLD AUTO: 5.8 %
MUCOUS THREADS #/AREA URNS AUTO: NORMAL /LPF
NEUTROPHILS # BLD AUTO: 5.75 X10*3/UL (ref 1.6–5.5)
NEUTROPHILS NFR BLD AUTO: 76 %
NITRITE UR QL STRIP.AUTO: NEGATIVE
NRBC BLD-RTO: 0 /100 WBCS (ref 0–0)
PH UR STRIP.AUTO: 5.5 [PH]
PLATELET # BLD AUTO: 284 X10*3/UL (ref 150–450)
POTASSIUM SERPL-SCNC: 4.8 MMOL/L (ref 3.5–5.3)
PROT SERPL-MCNC: 6.6 G/DL (ref 6.4–8.2)
PROT UR STRIP.AUTO-MCNC: NORMAL MG/DL
RBC # BLD AUTO: 3.19 X10*6/UL (ref 4–5.2)
RBC # UR STRIP.AUTO: NEGATIVE MG/DL
RBC #/AREA URNS AUTO: NORMAL /HPF
SODIUM SERPL-SCNC: 144 MMOL/L (ref 136–145)
SP GR UR STRIP.AUTO: 1.02
SQUAMOUS #/AREA URNS AUTO: NORMAL /HPF
TSH SERPL-ACNC: 2.28 MIU/L (ref 0.44–3.98)
UROBILINOGEN UR STRIP.AUTO-MCNC: NORMAL MG/DL
WBC # BLD AUTO: 7.6 X10*3/UL (ref 4.4–11.3)
WBC #/AREA URNS AUTO: NORMAL /HPF

## 2025-05-30 PROCEDURE — 99285 EMERGENCY DEPT VISIT HI MDM: CPT | Performed by: EMERGENCY MEDICINE

## 2025-05-30 PROCEDURE — 84484 ASSAY OF TROPONIN QUANT: CPT | Performed by: EMERGENCY MEDICINE

## 2025-05-30 PROCEDURE — 96372 THER/PROPH/DIAG INJ SC/IM: CPT | Performed by: HOSPITALIST

## 2025-05-30 PROCEDURE — 80053 COMPREHEN METABOLIC PANEL: CPT | Performed by: EMERGENCY MEDICINE

## 2025-05-30 PROCEDURE — 99223 1ST HOSP IP/OBS HIGH 75: CPT | Performed by: HOSPITALIST

## 2025-05-30 PROCEDURE — 2500000001 HC RX 250 WO HCPCS SELF ADMINISTERED DRUGS (ALT 637 FOR MEDICARE OP): Performed by: EMERGENCY MEDICINE

## 2025-05-30 PROCEDURE — 71045 X-RAY EXAM CHEST 1 VIEW: CPT

## 2025-05-30 PROCEDURE — 81001 URINALYSIS AUTO W/SCOPE: CPT | Performed by: EMERGENCY MEDICINE

## 2025-05-30 PROCEDURE — 2500000005 HC RX 250 GENERAL PHARMACY W/O HCPCS: Performed by: EMERGENCY MEDICINE

## 2025-05-30 PROCEDURE — 36415 COLL VENOUS BLD VENIPUNCTURE: CPT | Performed by: EMERGENCY MEDICINE

## 2025-05-30 PROCEDURE — 71045 X-RAY EXAM CHEST 1 VIEW: CPT | Performed by: INTERNAL MEDICINE

## 2025-05-30 PROCEDURE — 93005 ELECTROCARDIOGRAM TRACING: CPT

## 2025-05-30 PROCEDURE — 83690 ASSAY OF LIPASE: CPT | Performed by: EMERGENCY MEDICINE

## 2025-05-30 PROCEDURE — 2500000001 HC RX 250 WO HCPCS SELF ADMINISTERED DRUGS (ALT 637 FOR MEDICARE OP): Performed by: HOSPITALIST

## 2025-05-30 PROCEDURE — 96374 THER/PROPH/DIAG INJ IV PUSH: CPT | Mod: 59

## 2025-05-30 PROCEDURE — G0378 HOSPITAL OBSERVATION PER HR: HCPCS

## 2025-05-30 PROCEDURE — 2500000004 HC RX 250 GENERAL PHARMACY W/ HCPCS (ALT 636 FOR OP/ED): Performed by: HOSPITALIST

## 2025-05-30 PROCEDURE — 84443 ASSAY THYROID STIM HORMONE: CPT | Performed by: HOSPITALIST

## 2025-05-30 PROCEDURE — 99233 SBSQ HOSP IP/OBS HIGH 50: CPT | Performed by: STUDENT IN AN ORGANIZED HEALTH CARE EDUCATION/TRAINING PROGRAM

## 2025-05-30 PROCEDURE — 83880 ASSAY OF NATRIURETIC PEPTIDE: CPT | Performed by: EMERGENCY MEDICINE

## 2025-05-30 PROCEDURE — 85025 COMPLETE CBC W/AUTO DIFF WBC: CPT | Performed by: EMERGENCY MEDICINE

## 2025-05-30 RX ORDER — HYDRALAZINE HYDROCHLORIDE 25 MG/1
25 TABLET, FILM COATED ORAL 3 TIMES DAILY
COMMUNITY

## 2025-05-30 RX ORDER — ONDANSETRON 4 MG/1
4 TABLET, FILM COATED ORAL EVERY 8 HOURS PRN
COMMUNITY

## 2025-05-30 RX ORDER — DILTIAZEM HYDROCHLORIDE 60 MG/1
60 CAPSULE, EXTENDED RELEASE ORAL DAILY
Status: DISCONTINUED | OUTPATIENT
Start: 2025-05-31 | End: 2025-05-30

## 2025-05-30 RX ORDER — LEVOTHYROXINE SODIUM 25 UG/1
25 TABLET ORAL
Status: DISCONTINUED | OUTPATIENT
Start: 2025-05-31 | End: 2025-06-01 | Stop reason: HOSPADM

## 2025-05-30 RX ORDER — LISINOPRIL 5 MG/1
5 TABLET ORAL
COMMUNITY
Start: 2025-04-14

## 2025-05-30 RX ORDER — DILTIAZEM HYDROCHLORIDE 5 MG/ML
15 INJECTION INTRAVENOUS ONCE
Status: COMPLETED | OUTPATIENT
Start: 2025-05-30 | End: 2025-05-30

## 2025-05-30 RX ORDER — ACETAMINOPHEN 650 MG/1
650 SUPPOSITORY RECTAL EVERY 4 HOURS PRN
Status: DISCONTINUED | OUTPATIENT
Start: 2025-05-30 | End: 2025-06-01 | Stop reason: HOSPADM

## 2025-05-30 RX ORDER — ONDANSETRON 4 MG/1
4 TABLET, ORALLY DISINTEGRATING ORAL EVERY 8 HOURS PRN
Status: DISCONTINUED | OUTPATIENT
Start: 2025-05-30 | End: 2025-06-01 | Stop reason: HOSPADM

## 2025-05-30 RX ORDER — ONDANSETRON HYDROCHLORIDE 2 MG/ML
4 INJECTION, SOLUTION INTRAVENOUS EVERY 8 HOURS PRN
Status: DISCONTINUED | OUTPATIENT
Start: 2025-05-30 | End: 2025-06-01 | Stop reason: HOSPADM

## 2025-05-30 RX ORDER — CLOPIDOGREL BISULFATE 75 MG/1
75 TABLET ORAL DAILY
COMMUNITY

## 2025-05-30 RX ORDER — CHOLECALCIFEROL (VITAMIN D3) 25 MCG
25 TABLET ORAL
COMMUNITY
Start: 2025-03-02

## 2025-05-30 RX ORDER — ACETAMINOPHEN 325 MG/1
650 TABLET ORAL EVERY 4 HOURS PRN
Status: DISCONTINUED | OUTPATIENT
Start: 2025-05-30 | End: 2025-06-01 | Stop reason: HOSPADM

## 2025-05-30 RX ORDER — PANTOPRAZOLE SODIUM 40 MG/1
40 TABLET, DELAYED RELEASE ORAL
COMMUNITY

## 2025-05-30 RX ORDER — DILTIAZEM HCL/D5W 125 MG/125
PLASTIC BAG, INJECTION (ML) INTRAVENOUS
Status: DISCONTINUED
Start: 2025-05-30 | End: 2025-05-30 | Stop reason: WASHOUT

## 2025-05-30 RX ORDER — LANOLIN ALCOHOL/MO/W.PET/CERES
1000 CREAM (GRAM) TOPICAL DAILY
COMMUNITY

## 2025-05-30 RX ORDER — LANOLIN ALCOHOL/MO/W.PET/CERES
1000 CREAM (GRAM) TOPICAL DAILY
Status: DISCONTINUED | OUTPATIENT
Start: 2025-05-31 | End: 2025-06-01 | Stop reason: HOSPADM

## 2025-05-30 RX ORDER — LEVOTHYROXINE SODIUM 25 UG/1
1 TABLET ORAL
COMMUNITY
Start: 2024-12-10

## 2025-05-30 RX ORDER — FUROSEMIDE 10 MG/ML
10 INJECTION INTRAMUSCULAR; INTRAVENOUS EVERY 24 HOURS
Status: DISCONTINUED | OUTPATIENT
Start: 2025-05-30 | End: 2025-05-31

## 2025-05-30 RX ORDER — FERROUS SULFATE 325(65) MG
1 TABLET ORAL 2 TIMES DAILY
COMMUNITY

## 2025-05-30 RX ORDER — ACETAMINOPHEN 160 MG/5ML
650 SOLUTION ORAL EVERY 4 HOURS PRN
Status: DISCONTINUED | OUTPATIENT
Start: 2025-05-30 | End: 2025-06-01 | Stop reason: HOSPADM

## 2025-05-30 RX ORDER — DILTIAZEM HYDROCHLORIDE 30 MG/1
30 TABLET, FILM COATED ORAL ONCE
Status: COMPLETED | OUTPATIENT
Start: 2025-05-30 | End: 2025-05-30

## 2025-05-30 RX ORDER — ACETAMINOPHEN 500 MG
TABLET ORAL EVERY 8 HOURS
COMMUNITY
End: 2025-05-30 | Stop reason: ENTERED-IN-ERROR

## 2025-05-30 RX ORDER — CLOPIDOGREL BISULFATE 75 MG/1
75 TABLET ORAL DAILY
Status: DISCONTINUED | OUTPATIENT
Start: 2025-05-31 | End: 2025-06-01 | Stop reason: HOSPADM

## 2025-05-30 RX ORDER — DILTIAZEM HYDROCHLORIDE 120 MG/1
120 CAPSULE, COATED, EXTENDED RELEASE ORAL EVERY 24 HOURS
Status: DISCONTINUED | OUTPATIENT
Start: 2025-05-30 | End: 2025-06-01 | Stop reason: HOSPADM

## 2025-05-30 RX ORDER — PANTOPRAZOLE SODIUM 40 MG/1
40 TABLET, DELAYED RELEASE ORAL
Status: DISCONTINUED | OUTPATIENT
Start: 2025-05-31 | End: 2025-06-01 | Stop reason: HOSPADM

## 2025-05-30 RX ORDER — POLYETHYLENE GLYCOL 3350 17 G/17G
17 POWDER, FOR SOLUTION ORAL DAILY
Status: DISCONTINUED | OUTPATIENT
Start: 2025-05-31 | End: 2025-06-01 | Stop reason: HOSPADM

## 2025-05-30 RX ORDER — HYDRALAZINE HYDROCHLORIDE 25 MG/1
25 TABLET, FILM COATED ORAL 3 TIMES DAILY
Status: CANCELLED | OUTPATIENT
Start: 2025-05-30

## 2025-05-30 RX ORDER — FERROUS SULFATE 325(65) MG
1 TABLET ORAL 2 TIMES DAILY
Status: DISCONTINUED | OUTPATIENT
Start: 2025-05-30 | End: 2025-06-01 | Stop reason: HOSPADM

## 2025-05-30 RX ORDER — HEPARIN SODIUM 5000 [USP'U]/ML
5000 INJECTION, SOLUTION INTRAVENOUS; SUBCUTANEOUS EVERY 8 HOURS SCHEDULED
Status: DISCONTINUED | OUTPATIENT
Start: 2025-05-30 | End: 2025-06-01 | Stop reason: HOSPADM

## 2025-05-30 RX ORDER — ROSUVASTATIN CALCIUM 20 MG/1
1 TABLET, COATED ORAL
COMMUNITY
Start: 2025-02-04 | End: 2025-05-30 | Stop reason: ENTERED-IN-ERROR

## 2025-05-30 RX ADMIN — FERROUS SULFATE TAB 325 MG (65 MG ELEMENTAL FE) 1 TABLET: 325 (65 FE) TAB at 21:12

## 2025-05-30 RX ADMIN — HEPARIN SODIUM 5000 UNITS: 5000 INJECTION, SOLUTION INTRAVENOUS; SUBCUTANEOUS at 21:12

## 2025-05-30 RX ADMIN — DILTIAZEM HYDROCHLORIDE 30 MG: 30 TABLET, FILM COATED ORAL at 16:25

## 2025-05-30 RX ADMIN — DILTIAZEM HYDROCHLORIDE 15 MG: 5 INJECTION, SOLUTION INTRAVENOUS at 15:50

## 2025-05-30 RX ADMIN — FUROSEMIDE 10 MG: 10 INJECTION, SOLUTION INTRAMUSCULAR; INTRAVENOUS at 21:12

## 2025-05-30 SDOH — SOCIAL STABILITY: SOCIAL INSECURITY: DO YOU FEEL ANYONE HAS EXPLOITED OR TAKEN ADVANTAGE OF YOU FINANCIALLY OR OF YOUR PERSONAL PROPERTY?: NO

## 2025-05-30 SDOH — SOCIAL STABILITY: SOCIAL INSECURITY: WITHIN THE LAST YEAR, HAVE YOU BEEN AFRAID OF YOUR PARTNER OR EX-PARTNER?: PATIENT DECLINED

## 2025-05-30 SDOH — SOCIAL STABILITY: SOCIAL INSECURITY: DO YOU FEEL UNSAFE GOING BACK TO THE PLACE WHERE YOU ARE LIVING?: NO

## 2025-05-30 SDOH — SOCIAL STABILITY: SOCIAL INSECURITY: ARE YOU OR HAVE YOU BEEN THREATENED OR ABUSED PHYSICALLY, EMOTIONALLY, OR SEXUALLY BY ANYONE?: NO

## 2025-05-30 SDOH — ECONOMIC STABILITY: HOUSING INSECURITY: IN THE LAST 12 MONTHS, WAS THERE A TIME WHEN YOU WERE NOT ABLE TO PAY THE MORTGAGE OR RENT ON TIME?: NO

## 2025-05-30 SDOH — ECONOMIC STABILITY: FOOD INSECURITY: WITHIN THE PAST 12 MONTHS, YOU WORRIED THAT YOUR FOOD WOULD RUN OUT BEFORE YOU GOT THE MONEY TO BUY MORE.: NEVER TRUE

## 2025-05-30 SDOH — ECONOMIC STABILITY: INCOME INSECURITY: IN THE PAST 12 MONTHS HAS THE ELECTRIC, GAS, OIL, OR WATER COMPANY THREATENED TO SHUT OFF SERVICES IN YOUR HOME?: NO

## 2025-05-30 SDOH — SOCIAL STABILITY: SOCIAL INSECURITY
WITHIN THE LAST YEAR, HAVE YOU BEEN RAPED OR FORCED TO HAVE ANY KIND OF SEXUAL ACTIVITY BY YOUR PARTNER OR EX-PARTNER?: PATIENT DECLINED

## 2025-05-30 SDOH — ECONOMIC STABILITY: FOOD INSECURITY: HOW HARD IS IT FOR YOU TO PAY FOR THE VERY BASICS LIKE FOOD, HOUSING, MEDICAL CARE, AND HEATING?: NOT VERY HARD

## 2025-05-30 SDOH — SOCIAL STABILITY: SOCIAL INSECURITY: ARE THERE ANY APPARENT SIGNS OF INJURIES/BEHAVIORS THAT COULD BE RELATED TO ABUSE/NEGLECT?: NO

## 2025-05-30 SDOH — SOCIAL STABILITY: SOCIAL INSECURITY
WITHIN THE LAST YEAR, HAVE YOU BEEN KICKED, HIT, SLAPPED, OR OTHERWISE PHYSICALLY HURT BY YOUR PARTNER OR EX-PARTNER?: PATIENT DECLINED

## 2025-05-30 SDOH — ECONOMIC STABILITY: HOUSING INSECURITY: AT ANY TIME IN THE PAST 12 MONTHS, WERE YOU HOMELESS OR LIVING IN A SHELTER (INCLUDING NOW)?: NO

## 2025-05-30 SDOH — SOCIAL STABILITY: SOCIAL INSECURITY
WITHIN THE LAST YEAR, HAVE YOU BEEN HUMILIATED OR EMOTIONALLY ABUSED IN OTHER WAYS BY YOUR PARTNER OR EX-PARTNER?: PATIENT DECLINED

## 2025-05-30 SDOH — SOCIAL STABILITY: SOCIAL INSECURITY: DOES ANYONE TRY TO KEEP YOU FROM HAVING/CONTACTING OTHER FRIENDS OR DOING THINGS OUTSIDE YOUR HOME?: NO

## 2025-05-30 SDOH — ECONOMIC STABILITY: HOUSING INSECURITY: IN THE PAST 12 MONTHS, HOW MANY TIMES HAVE YOU MOVED WHERE YOU WERE LIVING?: 0

## 2025-05-30 SDOH — ECONOMIC STABILITY: FOOD INSECURITY: WITHIN THE PAST 12 MONTHS, THE FOOD YOU BOUGHT JUST DIDN'T LAST AND YOU DIDN'T HAVE MONEY TO GET MORE.: NEVER TRUE

## 2025-05-30 SDOH — SOCIAL STABILITY: SOCIAL INSECURITY: HAVE YOU HAD THOUGHTS OF HARMING ANYONE ELSE?: NO

## 2025-05-30 SDOH — SOCIAL STABILITY: SOCIAL INSECURITY: HAS ANYONE EVER THREATENED TO HURT YOUR FAMILY OR YOUR PETS?: NO

## 2025-05-30 SDOH — ECONOMIC STABILITY: TRANSPORTATION INSECURITY: IN THE PAST 12 MONTHS, HAS LACK OF TRANSPORTATION KEPT YOU FROM MEDICAL APPOINTMENTS OR FROM GETTING MEDICATIONS?: NO

## 2025-05-30 SDOH — SOCIAL STABILITY: SOCIAL INSECURITY: ABUSE: ADULT

## 2025-05-30 ASSESSMENT — ACTIVITIES OF DAILY LIVING (ADL)
HEARING - RIGHT EAR: FUNCTIONAL
ADEQUATE_TO_COMPLETE_ADL: YES
PATIENT'S MEMORY ADEQUATE TO SAFELY COMPLETE DAILY ACTIVITIES?: YES
GROOMING: NEEDS ASSISTANCE
DRESSING YOURSELF: NEEDS ASSISTANCE
JUDGMENT_ADEQUATE_SAFELY_COMPLETE_DAILY_ACTIVITIES: YES
LACK_OF_TRANSPORTATION: NO
WALKS IN HOME: NEEDS ASSISTANCE
BATHING: NEEDS ASSISTANCE
HEARING - LEFT EAR: FUNCTIONAL
FEEDING YOURSELF: INDEPENDENT
TOILETING: NEEDS ASSISTANCE

## 2025-05-30 ASSESSMENT — COLUMBIA-SUICIDE SEVERITY RATING SCALE - C-SSRS
6. HAVE YOU EVER DONE ANYTHING, STARTED TO DO ANYTHING, OR PREPARED TO DO ANYTHING TO END YOUR LIFE?: NO
1. IN THE PAST MONTH, HAVE YOU WISHED YOU WERE DEAD OR WISHED YOU COULD GO TO SLEEP AND NOT WAKE UP?: NO
2. HAVE YOU ACTUALLY HAD ANY THOUGHTS OF KILLING YOURSELF?: NO

## 2025-05-30 ASSESSMENT — LIFESTYLE VARIABLES
HOW OFTEN DO YOU HAVE 6 OR MORE DRINKS ON ONE OCCASION: NEVER
AUDIT-C TOTAL SCORE: 0
HOW OFTEN DO YOU HAVE A DRINK CONTAINING ALCOHOL: NEVER
AUDIT-C TOTAL SCORE: 0
HOW MANY STANDARD DRINKS CONTAINING ALCOHOL DO YOU HAVE ON A TYPICAL DAY: PATIENT DOES NOT DRINK
SKIP TO QUESTIONS 9-10: 1

## 2025-05-30 ASSESSMENT — COGNITIVE AND FUNCTIONAL STATUS - GENERAL
DRESSING REGULAR UPPER BODY CLOTHING: A LITTLE
DAILY ACTIVITIY SCORE: 18
PATIENT BASELINE BEDBOUND: NO
CLIMB 3 TO 5 STEPS WITH RAILING: A LOT
DRESSING REGULAR LOWER BODY CLOTHING: A LITTLE
MOBILITY SCORE: 20
WALKING IN HOSPITAL ROOM: A LITTLE
MOVING TO AND FROM BED TO CHAIR: A LITTLE
TOILETING: A LITTLE
HELP NEEDED FOR BATHING: A LOT
PERSONAL GROOMING: A LITTLE

## 2025-05-30 ASSESSMENT — PATIENT HEALTH QUESTIONNAIRE - PHQ9
2. FEELING DOWN, DEPRESSED OR HOPELESS: NOT AT ALL
1. LITTLE INTEREST OR PLEASURE IN DOING THINGS: NOT AT ALL
SUM OF ALL RESPONSES TO PHQ9 QUESTIONS 1 & 2: 0

## 2025-05-30 ASSESSMENT — PAIN - FUNCTIONAL ASSESSMENT: PAIN_FUNCTIONAL_ASSESSMENT: 0-10

## 2025-05-30 ASSESSMENT — PAIN SCALES - GENERAL
PAINLEVEL_OUTOF10: 0 - NO PAIN
PAINLEVEL_OUTOF10: 5 - MODERATE PAIN

## 2025-05-30 NOTE — ED PROVIDER NOTES
HPI   No chief complaint on file.      Limitations to History:     HPI: 89-year-old female presents with concern for increased heart rate and blood pressure.  Presents from local nursing facility.  Patient only complaining of abdominal pain.  Denies any chest pain, shortness of breath, nausea, vomiting, urinary symptoms.    Additional History Obtained from: EMS    ------------------------------------------------------------------------------------------------------------------------------------------  Physical Exam:    VS: As documented in the triage note and EMR flowsheet from this visit were reviewed.    Appearance: Alert. cooperative,  in no acute distress.   Skin: Intact,  dry skin, no lesions, rash, petechiae or purpura.   Eyes: PERRLA, EOMs intact,  Conjunctiva pink with no redness or exudates.   HENT: Normocephalic, atraumatic. Nares patent. No intraoral lesions.   Neck: Supple, without meningismus. Trachea at midline. No lymphadenopathy.  Pulmonary: Clear bilaterally with good chest wall excursion. No rales, rhonchi or wheezing. No accessory muscle use or stridor.  Cardiac: Tachycardic and irregular rhythm, no rubs, murmurs, or gallops.   Abdomen: Abdomen is soft.  Tenderness to palpation diffusely.  Nondistended.  No palpable organomegaly.  No rebound or guarding.  No CVA tenderness. Nonsurgical abdomen.  Genitourinary: Exam deferred.  Musculoskeletal: Full range of motion.  Pulses full and equal. No cyanosis, clubbing, or edema.  Neurological:  Cranial nerves are grossly intact, grossly normal sensation, no weakness, no focal findings identified.  Psychiatric: Appropriate mood and affect.                Patient History   Medical History[1]  Surgical History[2]  Family History[3]  Social History[4]    Physical Exam   ED Triage Vitals   Temp Pulse Resp BP   -- -- -- --      SpO2 Temp src Heart Rate Source Patient Position   -- -- -- --      BP Location FiO2 (%)     -- --       Physical Exam      ED Course &  MDM   Diagnoses as of 05/30/25 1800   New onset atrial fibrillation (Multi)   Pleural effusion                 No data recorded                                 Medical Decision Making  Labs Reviewed  CBC WITH AUTO DIFFERENTIAL - Abnormal     WBC                           7.6                    nRBC                          0.0                    RBC                           3.19 (*)               Hemoglobin                    10.1 (*)               Hematocrit                    31.8 (*)               MCV                           100                    MCH                           31.7                   MCHC                          31.8 (*)               RDW                           15.6 (*)               Platelets                     284                    Neutrophils %                 76.0                   Immature Granulocytes %, Automated   0.4                    Lymphocytes %                 14.6                   Monocytes %                   5.8                    Eosinophils %                 2.1                    Basophils %                   1.1                    Neutrophils Absolute          5.75 (*)               Immature Granulocytes Absolute, Au*   0.03                   Lymphocytes Absolute          1.10                   Monocytes Absolute            0.44                   Eosinophils Absolute          0.16                   Basophils Absolute            0.08                COMPREHENSIVE METABOLIC PANEL - Abnormal     Glucose                       101 (*)                Sodium                        144                    Potassium                     4.8                    Chloride                      114 (*)                Bicarbonate                   24                     Anion Gap                     11                     Urea Nitrogen                 45 (*)                 Creatinine                    1.91 (*)               eGFR                          24 (*)                 Calcium                        8.9                    Albumin                       3.0 (*)                Alkaline Phosphatase          93                     Total Protein                 6.6                    AST                           15                     Bilirubin, Total              0.3                    ALT                           13                  TROPONIN I, HIGH SENSITIVITY - Abnormal     Troponin I, High Sensitivity   51 (*)                     Narrative: Less than 99th percentile of normal range cutoff-                  Female and children under 18 years old <14 ng/L; Male <21 ng/L: Negative                  Repeat testing should be performed if clinically indicated.                                     Female and children under 18 years old 14-50 ng/L; Male 21-50 ng/L:                  Consistent with possible cardiac damage and possible increased clinical                   risk. Serial measurements may help to assess extent of myocardial damage.                                     >50 ng/L: Consistent with cardiac damage, increased clinical risk and                  myocardial infarction. Serial measurements may help assess extent of                   myocardial damage.                                      NOTE: Children less than 1 year old may have higher baseline troponin                   levels and results should be interpreted in conjunction with the overall                   clinical context.                                     NOTE: Troponin I testing is performed using a different                   testing methodology at [facility] than at other                   [facility]. Direct result comparisons should only                   be made within the same method.  TROPONIN I, HIGH SENSITIVITY - Abnormal     Troponin I, High Sensitivity   46 (*)                     Narrative: Less than 99th percentile of normal range cutoff-                  Female and children under 18 years  old <14 ng/L; Male <21 ng/L: Negative                  Repeat testing should be performed if clinically indicated.                                     Female and children under 18 years old 14-50 ng/L; Male 21-50 ng/L:                  Consistent with possible cardiac damage and possible increased clinical                   risk. Serial measurements may help to assess extent of myocardial damage.                                     >50 ng/L: Consistent with cardiac damage, increased clinical risk and                  myocardial infarction. Serial measurements may help assess extent of                   myocardial damage.                                      NOTE: Children less than 1 year old may have higher baseline troponin                   levels and results should be interpreted in conjunction with the overall                   clinical context.                                     NOTE: Troponin I testing is performed using a different                   testing methodology at [facility]3] than at other                   [facility]. Direct result comparisons should only                   be made within the same method.  LIPASE - Normal     Lipase                        16                         Narrative: Venipuncture immediately after or during the administration of Metamizole may lead to falsely low results. Testing should be performed immediately prior to Metamizole dosing.  URINALYSIS WITH REFLEX CULTURE AND MICROSCOPIC - Normal     Color, Urine                                         Appearance, Urine             Clear                  Specific Gravity, Urine       1.017                  pH, Urine                     5.5                    Protein, Urine                20 (TRACE)                Glucose, Urine                Normal                 Blood, Urine                  NEGATIVE                Ketones, Urine                NEGATIVE                Bilirubin, Urine               NEGATIVE                Urobilinogen, Urine           Normal                 Nitrite, Urine                NEGATIVE                Leukocyte Esterase, Urine     NEGATIVE             URINALYSIS WITH REFLEX CULTURE AND MICROSCOPIC         Narrative: The following orders were created for panel order Urinalysis with Reflex Culture and Microscopic.                  Procedure                               Abnormality         Status                                     ---------                               -----------         ------                                     Urinalysis with Reflex C...[[id number]]  Normal              Final result                               Extra Urine Gray Tube[[id number]]                                                                                         Please view results for these tests on the individual orders.  EXTRA URINE GRAY TUBE  B-TYPE NATRIURETIC PEPTIDE  URINALYSIS MICROSCOPIC WITH REFLEX CULTURE  XR chest 1 view   Final Result    Interval increase in cardiomegaly/pericardial effusion with new    moderate right and small left pleural effusions with superimposed    atelectasis/infiltrate.          Signed by: Jaci Walsh 5/30/2025 3:37 PM    Dictation workstation:   [id number]     Medical Decision Making:    Patient appears well nontoxic.  Atrial fibrillation with RVR.  Heart rate approximately 130.  Patient treated with intravenous Cardizem.  Good improvement in patient's heart rate.  Treated with oral Cardizem.  Initial troponin of 51 and 46 on repeat.  Chest x-ray shows bilateral pleural effusions.  Patient is not hypoxemic.  Case discussed with hospitalist and patient will be admitted for further treatment and evaluation.  Stable at time of admission.    Differential Diagnoses Considered: Onset atrial fibrillation, heart failure, pleural effusion, pneumonia, electrolyte abnormality    Independent Interpretation of Studies:  I independently interpreted: Chest x-ray shows  bilateral effusions.  Escalation of Care:  Appropriate for mission for further treatment and evaluation.    Discussion of Management with Other Providers:   I discussed the patient/results with: Admitting hospitalist.        Procedure  ECG 12 lead    Performed by: Naeem Cordero DO  Authorized by: Naeem Cordero DO    ECG interpreted by ED Physician in the absence of a cardiologist: yes    Comments:      EKG interpreted by Dr. Naeem Cordero: Atrial fibrillation with a rapid ventricular response at 137 bpm.  QTc of 401 ms.  Nonspecific ST changes.  ECG 12 lead    Performed by: Naeem Cordero DO  Authorized by: Naeem Cordero DO    ECG interpreted by ED Physician in the absence of a cardiologist: yes    Comments:      Repeat EKG performed at 1617 interpreted by Dr. Alexis Cortez at 1619: Atrial fibrillation 92 bpm.  QTc of 425 ms.  Nonspecific ST changes.         [1] No past medical history on file.  [2] No past surgical history on file.  [3] No family history on file.  [4]   Social History  Tobacco Use    Smoking status: Not on file    Smokeless tobacco: Not on file   Substance Use Topics    Alcohol use: Not on file    Drug use: Not on file        Naeem Cordero DO  05/30/25 1807

## 2025-05-31 ENCOUNTER — APPOINTMENT (OUTPATIENT)
Dept: CARDIOLOGY | Facility: HOSPITAL | Age: 89
DRG: 309 | End: 2025-05-31
Payer: MEDICARE

## 2025-05-31 LAB
ALBUMIN SERPL BCP-MCNC: 2.9 G/DL (ref 3.4–5)
ALP SERPL-CCNC: 81 U/L (ref 33–136)
ALT SERPL W P-5'-P-CCNC: 11 U/L (ref 7–45)
ANION GAP SERPL CALC-SCNC: 11 MMOL/L (ref 10–20)
AST SERPL W P-5'-P-CCNC: 15 U/L (ref 9–39)
BILIRUB SERPL-MCNC: 0.3 MG/DL (ref 0–1.2)
BUN SERPL-MCNC: 45 MG/DL (ref 6–23)
CALCIUM SERPL-MCNC: 8.8 MG/DL (ref 8.6–10.3)
CHLORIDE SERPL-SCNC: 114 MMOL/L (ref 98–107)
CO2 SERPL-SCNC: 22 MMOL/L (ref 21–32)
CREAT SERPL-MCNC: 1.79 MG/DL (ref 0.5–1.05)
EGFRCR SERPLBLD CKD-EPI 2021: 26 ML/MIN/1.73M*2
ERYTHROCYTE [DISTWIDTH] IN BLOOD BY AUTOMATED COUNT: 15.5 % (ref 11.5–14.5)
GLUCOSE SERPL-MCNC: 87 MG/DL (ref 74–99)
HCT VFR BLD AUTO: 31.1 % (ref 36–46)
HGB BLD-MCNC: 9.7 G/DL (ref 12–16)
MCH RBC QN AUTO: 31.2 PG (ref 26–34)
MCHC RBC AUTO-ENTMCNC: 31.2 G/DL (ref 32–36)
MCV RBC AUTO: 100 FL (ref 80–100)
NRBC BLD-RTO: 0 /100 WBCS (ref 0–0)
PLATELET # BLD AUTO: 257 X10*3/UL (ref 150–450)
POTASSIUM SERPL-SCNC: 4.7 MMOL/L (ref 3.5–5.3)
PROT SERPL-MCNC: 5.9 G/DL (ref 6.4–8.2)
RBC # BLD AUTO: 3.11 X10*6/UL (ref 4–5.2)
SODIUM SERPL-SCNC: 142 MMOL/L (ref 136–145)
WBC # BLD AUTO: 6.2 X10*3/UL (ref 4.4–11.3)

## 2025-05-31 PROCEDURE — 99233 SBSQ HOSP IP/OBS HIGH 50: CPT | Performed by: STUDENT IN AN ORGANIZED HEALTH CARE EDUCATION/TRAINING PROGRAM

## 2025-05-31 PROCEDURE — 2500000001 HC RX 250 WO HCPCS SELF ADMINISTERED DRUGS (ALT 637 FOR MEDICARE OP): Performed by: HOSPITALIST

## 2025-05-31 PROCEDURE — 36415 COLL VENOUS BLD VENIPUNCTURE: CPT | Performed by: HOSPITALIST

## 2025-05-31 PROCEDURE — 80053 COMPREHEN METABOLIC PANEL: CPT | Performed by: HOSPITALIST

## 2025-05-31 PROCEDURE — 2500000004 HC RX 250 GENERAL PHARMACY W/ HCPCS (ALT 636 FOR OP/ED): Performed by: HOSPITALIST

## 2025-05-31 PROCEDURE — 1200000002 HC GENERAL ROOM WITH TELEMETRY DAILY

## 2025-05-31 PROCEDURE — 93005 ELECTROCARDIOGRAM TRACING: CPT

## 2025-05-31 PROCEDURE — 99233 SBSQ HOSP IP/OBS HIGH 50: CPT | Performed by: INTERNAL MEDICINE

## 2025-05-31 PROCEDURE — 85027 COMPLETE CBC AUTOMATED: CPT | Performed by: HOSPITALIST

## 2025-05-31 PROCEDURE — 96372 THER/PROPH/DIAG INJ SC/IM: CPT | Performed by: HOSPITALIST

## 2025-05-31 PROCEDURE — 93010 ELECTROCARDIOGRAM REPORT: CPT | Performed by: STUDENT IN AN ORGANIZED HEALTH CARE EDUCATION/TRAINING PROGRAM

## 2025-05-31 RX ADMIN — FERROUS SULFATE TAB 325 MG (65 MG ELEMENTAL FE) 1 TABLET: 325 (65 FE) TAB at 21:01

## 2025-05-31 RX ADMIN — ONDANSETRON 4 MG: 2 INJECTION, SOLUTION INTRAMUSCULAR; INTRAVENOUS at 09:58

## 2025-05-31 RX ADMIN — LEVOTHYROXINE SODIUM 25 MCG: 25 TABLET ORAL at 06:39

## 2025-05-31 RX ADMIN — CLOPIDOGREL 75 MG: 75 TABLET ORAL at 13:19

## 2025-05-31 RX ADMIN — PANTOPRAZOLE SODIUM 40 MG: 40 TABLET, DELAYED RELEASE ORAL at 06:39

## 2025-05-31 RX ADMIN — DILTIAZEM HYDROCHLORIDE 120 MG: 120 CAPSULE, COATED, EXTENDED RELEASE ORAL at 17:27

## 2025-05-31 RX ADMIN — HEPARIN SODIUM 5000 UNITS: 5000 INJECTION, SOLUTION INTRAVENOUS; SUBCUTANEOUS at 13:21

## 2025-05-31 RX ADMIN — HEPARIN SODIUM 5000 UNITS: 5000 INJECTION, SOLUTION INTRAVENOUS; SUBCUTANEOUS at 06:39

## 2025-05-31 RX ADMIN — HEPARIN SODIUM 5000 UNITS: 5000 INJECTION, SOLUTION INTRAVENOUS; SUBCUTANEOUS at 21:01

## 2025-05-31 ASSESSMENT — PAIN SCALES - GENERAL
PAINLEVEL_OUTOF10: 0 - NO PAIN
PAINLEVEL_OUTOF10: 0 - NO PAIN

## 2025-05-31 ASSESSMENT — COGNITIVE AND FUNCTIONAL STATUS - GENERAL
MOVING FROM LYING ON BACK TO SITTING ON SIDE OF FLAT BED WITH BEDRAILS: A LITTLE
TOILETING: A LOT
DRESSING REGULAR UPPER BODY CLOTHING: A LITTLE
PERSONAL GROOMING: A LITTLE
MOBILITY SCORE: 17
DRESSING REGULAR LOWER BODY CLOTHING: A LITTLE
DAILY ACTIVITIY SCORE: 16
MOVING TO AND FROM BED TO CHAIR: A LITTLE
CLIMB 3 TO 5 STEPS WITH RAILING: A LOT
TURNING FROM BACK TO SIDE WHILE IN FLAT BAD: A LITTLE
DRESSING REGULAR UPPER BODY CLOTHING: A LITTLE
STANDING UP FROM CHAIR USING ARMS: A LITTLE
HELP NEEDED FOR BATHING: A LOT
DRESSING REGULAR LOWER BODY CLOTHING: A LITTLE
DAILY ACTIVITIY SCORE: 16
TOILETING: A LOT
EATING MEALS: A LITTLE
WALKING IN HOSPITAL ROOM: A LITTLE
WALKING IN HOSPITAL ROOM: A LITTLE
MOBILITY SCORE: 20
HELP NEEDED FOR BATHING: A LOT
PERSONAL GROOMING: A LITTLE
EATING MEALS: A LITTLE
MOVING TO AND FROM BED TO CHAIR: A LITTLE
CLIMB 3 TO 5 STEPS WITH RAILING: A LOT

## 2025-05-31 ASSESSMENT — PAIN - FUNCTIONAL ASSESSMENT
PAIN_FUNCTIONAL_ASSESSMENT: 0-10
PAIN_FUNCTIONAL_ASSESSMENT: 0-10

## 2025-05-31 NOTE — CARE PLAN
Problem: Pain - Adult  Goal: Verbalizes/displays adequate comfort level or baseline comfort level  Outcome: Progressing     Problem: Safety - Adult  Goal: Free from fall injury  Outcome: Progressing     Problem: Discharge Planning  Goal: Discharge to home or other facility with appropriate resources  Outcome: Progressing     Problem: Chronic Conditions and Co-morbidities  Goal: Patient's chronic conditions and co-morbidity symptoms are monitored and maintained or improved  Outcome: Progressing     Problem: Nutrition  Goal: Nutrient intake appropriate for maintaining nutritional needs  Outcome: Progressing     Problem: Skin  Goal: Decreased wound size/increased tissue granulation at next dressing change  5/31/2025 1609 by Isadora Wing RN  Flowsheets (Taken 5/31/2025 1609)  Decreased wound size/increased tissue granulation at next dressing change: Protective dressings over bony prominences  5/31/2025 1606 by Isadora Wing RN  Outcome: Progressing  Goal: Participates in plan/prevention/treatment measures  5/31/2025 1609 by Isadora Wing RN  Flowsheets (Taken 5/31/2025 1609)  Participates in plan/prevention/treatment measures: Elevate heels  5/31/2025 1606 by Isadora Wing RN  Outcome: Progressing  Goal: Prevent/manage excess moisture  5/31/2025 1609 by Isadora Wing RN  Flowsheets (Taken 5/31/2025 1609)  Prevent/manage excess moisture: Cleanse incontinence/protect with barrier cream  5/31/2025 1606 by Isadora Wing RN  Outcome: Progressing  Goal: Prevent/minimize sheer/friction injuries  5/31/2025 1609 by Isadora Wing RN  Flowsheets (Taken 5/31/2025 1609)  Prevent/minimize sheer/friction injuries:   Turn/reposition every 2 hours/use positioning/transfer devices   HOB 30 degrees or less  5/31/2025 1606 by Isadora Wing RN  Outcome: Progressing  Goal: Promote/optimize nutrition  5/31/2025 1609 by Isadora Wing RN  Flowsheets (Taken 5/31/2025  1609)  Promote/optimize nutrition: Offer water/supplements/favorite foods  5/31/2025 1606 by Isadora Wing RN  Outcome: Progressing  Goal: Promote skin healing  5/31/2025 1609 by Isadora Wing RN  Flowsheets (Taken 5/31/2025 1609)  Promote skin healing: Turn/reposition every 2 hours/use positioning/transfer devices  5/31/2025 1606 by Isadora Wing RN  Outcome: Progressing

## 2025-05-31 NOTE — PROGRESS NOTES
"Subjective Data:  Patient reports feeling well, no new adverse events overnight. Patient denies any chest pain, shortness of breath, palpitations, dizziness or syncope. Patient is hemodynamically stable.     Overnight Events:    No     Objective Data:  Last Recorded Vitals:  Vitals:    05/31/25 0000 05/31/25 0400 05/31/25 0700 05/31/25 1100   BP: 123/66 138/69 142/81 118/70   BP Location: Right arm Right arm Right arm Right arm   Patient Position: Lying Lying Lying Lying   Pulse: 79 85 87 75   Resp: 18 18 18 18   Temp: 36.3 °C (97.3 °F) 36.4 °C (97.5 °F) 36.1 °C (97 °F) 36 °C (96.8 °F)   TempSrc: Temporal Temporal Temporal Temporal   SpO2: 98% 96% 96% 94%   Weight:       Height:           Last Labs:  CBC - 5/31/2025:  4:15 AM  6.2 9.7 257    31.1      CMP - 5/31/2025:  4:14 AM  8.8 5.9 15 --- 0.3   _ 2.9 11 81      PTT - No results in last year.  _   _ _     TROPHS   Date/Time Value Ref Range Status   05/30/2025 05:08 PM 46 0 - 13 ng/L Final   05/30/2025 03:46 PM 51 0 - 13 ng/L Final     BNP   Date/Time Value Ref Range Status   05/30/2025 03:46  0 - 99 pg/mL Final      Last I/O:  I/O last 3 completed shifts:  In: - (0 mL/kg)   Out: 375 (8.9 mL/kg) [Urine:375 (0.2 mL/kg/hr)]  Weight: 42.2 kg     Past Cardiology Tests (Last 3 Years):  EKG:  ECG 12 lead 05/30/2025 (Wet Read)      ECG 12 lead 05/30/2025 (Preliminary)    Echo:  No results found for this or any previous visit from the past 1095 days.    Ejection Fractions:  No results found for: \"EF\"  Cath:  No results found for this or any previous visit from the past 1095 days.    Stress Test:  No results found for this or any previous visit from the past 1095 days.    Cardiac Imaging:  No results found for this or any previous visit from the past 1095 days.      Inpatient Medications:  Scheduled Medications[1]  PRN Medications[2]  Continuous Medications[3]    Physical Exam:  General: alert, confused and in no acute distress. Frail  HEENT: NC/AT; EOMI; PERRLA, " external ear is normal  Neck: supple; trachea midline; no masses; no JVD  Chest: clear breath sounds bilaterally; no wheezing  Cardio: Irregular rhythm, S1S2 normal, systolic murmur 2+/6+ RUSB  Abdomen: Soft, non-tender, non-distension, no organomegaly  Extremities: no clubbing/cyanosis/edema     Assessment/Plan     Mrs. Sandro Hamilton is a 89 y.o. female being consulted by the Cardiology team for Afib RVR. Patient with past medical history significant for hypertension, Aortic valve stenosis, Chronic kidney disease, TIA, vascular dementia, GERD, Anemia, Hypothyroidism, Coronary artery disease. Patient is a poor historian and cannot give reliable information at this point. History has been obtained from previous records. Per chart review, she was brought from nursing home to ED [facility] on 05/30/2025 due to tachycardia and elevated blood pressure. She denied chest pain, shortness of breath, leg edema, lightheadedness, headaches, fever, chills, orthopnea, paroxysmal nocturnal dyspnea or syncope. EKG showing A-fib RVR, was administered Cardizem IV with improvement in her heart rate. Trop 46 - 51. . CTA showing moderate right-sided effusion and possible pericardial effusion. She has been admitted for clinical compensation.     Assessment     # New Onset Atrial Fibrillation RVR / HF with Preserved LV systolic function NYHA III  - Trop 46 - 51.  -   - Elevated [id number] score which implies higher risk for thromboembolic events yearly, therefore should be started on stroke prophylaxis with DOAC.  - EKG showing atrial fibrillation.  - Echo pending.  - Would suggest to switch Cardizem IV drip to PO - Cardizem CD 120mg daily.  - Would suggest to address thyroid function.  - Would suggest to switch Clopidogrel for Eliquis if the risks for bleeding are not deemed to be high. If risks are high, then keeping Clopidogrel would be reasonable.   - Due to patient's age and frailty, a rate control strategy would be  a reasonable option at this point.  - Follow up in Cardiology clinic.     # Hypertension  - Controlled blood pressure.  - Keep current medications including Lisinopril 5mg BID, Hydralazine 25mg q8h.  - Patient counseled to keep a healthy lifestyle including regular exercise and low-sodium diet.  - Recommended home blood pressure monitoring.  - Goal of BP < 130/80mmHg.     # CKD  - Crea 1.9. - 1.79  - BUN 45 - 45.  - Follow up with Nephrology team.     # Hypothyroidism  - Would suggest to address thyroid function.  - TSH 2.28.  - Keep Levothyroxine 25mcg daily.      Thank you for allowing me to participate in the care of this patient. Please reach me out if you have any questions or if you need any clarifications regarding the patient's care.    Peripheral IV 05/30/25 20 G Right Antecubital (Active)   Site Assessment Dry;Intact;Clean 05/31/25 1000   Dressing Type Transparent 05/31/25 1000   Line Status Saline locked 05/31/25 1000   Dressing Status Clean;Dry;Occlusive 05/31/25 1000   Number of days: 1     Code Status:  DNR Comfort Measures Only    Aj Clark MD  Cardiology        [1]   Scheduled medications   Medication Dose Route Frequency    clopidogrel  75 mg oral Daily    cyanocobalamin  1,000 mcg oral Daily    dilTIAZem CD  120 mg oral q24h    ferrous sulfate  1 tablet oral BID    heparin (porcine)  5,000 Units subcutaneous q8h DAVION    levothyroxine  25 mcg oral Daily    pantoprazole  40 mg oral Daily before breakfast    polyethylene glycol  17 g oral Daily   [2]   PRN medications   Medication    acetaminophen    Or    acetaminophen    Or    acetaminophen    acetaminophen    Or    acetaminophen    Or    acetaminophen    ondansetron ODT    Or    ondansetron   [3]   Continuous Medications   Medication Dose Last Rate

## 2025-05-31 NOTE — CARE PLAN
The patient's goals for the shift include  be comfortable and not be nauseous     The clinical goals for the shift include nausea control      Problem: Pain - Adult  Goal: Verbalizes/displays adequate comfort level or baseline comfort level  Outcome: Progressing     Problem: Safety - Adult  Goal: Free from fall injury  Outcome: Progressing     Problem: Discharge Planning  Goal: Discharge to home or other facility with appropriate resources  Outcome: Progressing     Problem: Chronic Conditions and Co-morbidities  Goal: Patient's chronic conditions and co-morbidity symptoms are monitored and maintained or improved  Outcome: Progressing     Problem: Nutrition  Goal: Nutrient intake appropriate for maintaining nutritional needs  Outcome: Progressing     Problem: Skin  Goal: Decreased wound size/increased tissue granulation at next dressing change  Outcome: Progressing  Goal: Participates in plan/prevention/treatment measures  Outcome: Progressing  Goal: Prevent/manage excess moisture  Outcome: Progressing  Goal: Prevent/minimize sheer/friction injuries  Outcome: Progressing  Goal: Promote/optimize nutrition  Outcome: Progressing  Goal: Promote skin healing  Outcome: Progressing

## 2025-05-31 NOTE — H&P
History Of Present Illness  Sandro Hamilton is a 89 y.o. female presenting with increased heart rate and blood pressure from nursing facility.  She denies any chest pain shortness of breath palpitations or syncope.  Seems to be poor historian however.  Patient unable to give clear history.  She mumbles words at times and frail elderly fragile with advanced age.  Denies fall trauma loss of consciousness.  Again, she is not a reliable historian at present.  I am unable to elicit proper history at this time.  She is alert but pleasantly confused.  Able to follow commands denies headache neck pain focal weakness.  No bleeding from anywhere.  Found to be in A-fib RVR given Cardizem IV that improved heart rate.  Imaging showing moderate right-sided effusion and possible pericardial effusion.  Cardiology and critical care consulted , hemodynamically stable  Seems to have low appetite and weight loss which can be expected at this age.  Denies any symptoms to me otherwise.  She complained of abdominal discomfort in ER.  No apparent back pain flank pain hematuria or dysuria.  No other symptoms described   Patient does not seem to be reliable historian to reiterate  Past Medical History  Medical History[1]  Aortic valve stenosis  Chronic kidney disease  TIA  vascular dementia  GERD  Anemia  Hypothyroidism  Pneumonia  Hypertension  Coronary artery disease possibly  Surgical History  Surgical History[2]     Social History  She has no history on file for tobacco use, alcohol use, and drug use.    Family History  Family History[3]     Allergies  Adhesive, Amlodipine, and Toprol xl [metoprolol succinate]    Review of Systems  All other 12 point review of systems negative except HPI  Physical Exam   General Appearance: AAO x 1, frail appearing  Skin: skin color pink, warm, and dry; no suspicious rashes or lesions  Eyes : PERRL, EOM's intact  ENT: mucous membranes pink and moist  Neck: normocephalic  Respiratory: Quite  diminished  Heart: Irregularly irregular rhythm.  Murmur present  Abdomen: Nondistended, positive bowel sounds x4, soft,  nontender  Extremities: no edema   Peripheral pulses: normal x4 extremities  Neuro: Awake, no gross focal deficits  Last Recorded Vitals  Blood pressure 113/70, pulse 78, temperature 36.1 °C (97 °F), temperature source Temporal, resp. rate 18, height 1.524 m (5'), weight (!) 42.2 kg (93 lb 0.6 oz), SpO2 96%.    Relevant Results  Results for orders placed or performed during the hospital encounter of 05/30/25 (from the past 24 hours)   ECG 12 lead   Result Value Ref Range    Ventricular Rate 137 BPM    QRS Duration 86 ms    QT Interval 266 ms    QTC Calculation(Bazett) 401 ms    R Axis -31 degrees    T Axis 97 degrees    QRS Count 23 beats    Q Onset 224 ms    T Offset 357 ms    QTC Fredericia 350 ms   CBC and Auto Differential   Result Value Ref Range    WBC 7.6 4.4 - 11.3 x10*3/uL    nRBC 0.0 0.0 - 0.0 /100 WBCs    RBC 3.19 (L) 4.00 - 5.20 x10*6/uL    Hemoglobin 10.1 (L) 12.0 - 16.0 g/dL    Hematocrit 31.8 (L) 36.0 - 46.0 %     80 - 100 fL    MCH 31.7 26.0 - 34.0 pg    MCHC 31.8 (L) 32.0 - 36.0 g/dL    RDW 15.6 (H) 11.5 - 14.5 %    Platelets 284 150 - 450 x10*3/uL    Neutrophils % 76.0 40.0 - 80.0 %    Immature Granulocytes %, Automated 0.4 0.0 - 0.9 %    Lymphocytes % 14.6 13.0 - 44.0 %    Monocytes % 5.8 2.0 - 10.0 %    Eosinophils % 2.1 0.0 - 6.0 %    Basophils % 1.1 0.0 - 2.0 %    Neutrophils Absolute 5.75 (H) 1.60 - 5.50 x10*3/uL    Immature Granulocytes Absolute, Automated 0.03 0.00 - 0.50 x10*3/uL    Lymphocytes Absolute 1.10 0.80 - 3.00 x10*3/uL    Monocytes Absolute 0.44 0.05 - 0.80 x10*3/uL    Eosinophils Absolute 0.16 0.00 - 0.40 x10*3/uL    Basophils Absolute 0.08 0.00 - 0.10 x10*3/uL   Comprehensive metabolic panel   Result Value Ref Range    Glucose 101 (H) 74 - 99 mg/dL    Sodium 144 136 - 145 mmol/L    Potassium 4.8 3.5 - 5.3 mmol/L    Chloride 114 (H) 98 - 107 mmol/L     Bicarbonate 24 21 - 32 mmol/L    Anion Gap 11 10 - 20 mmol/L    Urea Nitrogen 45 (H) 6 - 23 mg/dL    Creatinine 1.91 (H) 0.50 - 1.05 mg/dL    eGFR 24 (L) >60 mL/min/1.73m*2    Calcium 8.9 8.6 - 10.3 mg/dL    Albumin 3.0 (L) 3.4 - 5.0 g/dL    Alkaline Phosphatase 93 33 - 136 U/L    Total Protein 6.6 6.4 - 8.2 g/dL    AST 15 9 - 39 U/L    Bilirubin, Total 0.3 0.0 - 1.2 mg/dL    ALT 13 7 - 45 U/L   Lipase   Result Value Ref Range    Lipase 16 9 - 82 U/L   Troponin I, High Sensitivity   Result Value Ref Range    Troponin I, High Sensitivity 51 (HH) 0 - 13 ng/L   B-type natriuretic peptide   Result Value Ref Range     (H) 0 - 99 pg/mL   Urinalysis with Reflex Culture and Microscopic   Result Value Ref Range    Color, Urine Light-Yellow Light-Yellow, Yellow, Dark-Yellow    Appearance, Urine Clear Clear    Specific Gravity, Urine 1.017 1.005 - 1.035    pH, Urine 5.5 5.0, 5.5, 6.0, 6.5, 7.0, 7.5, 8.0    Protein, Urine 20 (TRACE) NEGATIVE, 10 (TRACE), 20 (TRACE) mg/dL    Glucose, Urine Normal Normal mg/dL    Blood, Urine NEGATIVE NEGATIVE mg/dL    Ketones, Urine NEGATIVE NEGATIVE mg/dL    Bilirubin, Urine NEGATIVE NEGATIVE mg/dL    Urobilinogen, Urine Normal Normal mg/dL    Nitrite, Urine NEGATIVE NEGATIVE    Leukocyte Esterase, Urine NEGATIVE NEGATIVE   Urinalysis Microscopic   Result Value Ref Range    WBC, Urine 1-5 1-5, NONE /HPF    RBC, Urine 1-2 NONE, 1-2, 3-5 /HPF    Squamous Epithelial Cells, Urine 1-9 (SPARSE) Reference range not established. /HPF    Mucus, Urine FEW Reference range not established. /LPF   Troponin I, High Sensitivity   Result Value Ref Range    Troponin I, High Sensitivity 46 (H) 0 - 13 ng/L       ECG 12 lead  Result Date: 5/30/2025  Atrial fibrillation with rapid ventricular response Left axis deviation Nonspecific ST and T wave abnormality Abnormal ECG When compared with ECG of 27-APR-2021 09:01, Previous ECG has undetermined rhythm, needs review T wave inversion no longer evident in  Inferior leads T wave inversion no longer evident in Lateral leads    XR chest 1 view  Result Date: 5/30/2025  Interpreted By:  Jaci Walsh, STUDY: XR CHEST 1 VIEW;  5/30/2025 3:25 pm   INDICATION: Signs/Symptoms:new onset afib.   COMPARISON: 04/27/2021   ACCESSION NUMBER(S): [id number]   ORDERING CLINICIAN: OLLIE GARCIA   FINDINGS: AP radiograph of the chest was provided.       CARDIOMEDIASTINAL SILHOUETTE: Interval increase in cardiomegaly/pericardial effusion.   LUNGS: New moderate right and small left pleural effusions with superimposed atelectasis/infiltrate. No pneumothorax.   ABDOMEN: No remarkable upper abdominal findings.   BONES: No acute osseous changes.       Interval increase in cardiomegaly/pericardial effusion with new moderate right and small left pleural effusions with superimposed atelectasis/infiltrate.   Signed by: Jaci Walsh 5/30/2025 3:37 PM Dictation workstation:   [id number]      Scheduled medications  Scheduled Medications[4]  Continuous medications  Continuous Medications[5]  PRN medications  PRN Medications[6]         Assessment & Plan  New onset atrial fibrillation (Multi)    Disease of thyroid gland    A-fib (Multi)      89-year-old female with history of  Aortic valve stenosis  Chronic kidney disease  TIA  vascular dementia  GERD  Anemia  Hypothyroidism  Pneumonia  Hypertension  Coronary artery disease possibly    Presenting with  A-fib RVR  Right right pleural effusion, moderate  Possible pericardial effusion  Possible CHF exacerbation    Plan  Cardiopulmonary monitoring  Watch hemodynamics, follow vitals  Cardizem p.o. after Cardizem IV  Watch pressure and heart rate closely  Follow cardiology and nephrology  Patient has chronic kidney disease, can try low-dose Lasix, follow urine output and watch volume status  Daily CBC BMP, renal function  Check TSH  Monitor clinical progress  Continue outpatient medicines as currently indicated  Holding off anticoagulation but DVT  prophylaxis addressed  Fall, aspiration, seizure precautions  Neurochecks  To check with cardiology regarding anticoagulation use due to risk of falls and dementia  Supportive care, symptomatic management  On Plavix, iron supplement, PPI, MiraLAX, Synthroid, B12 supplement,  Troponins leak from nonischemic myocardial injury  BNP elevated  Check echocardiogram  SCDs for DVT prophylaxis, heparin  Further management as clinical course evolves             Shabbir Cervantes MD         [1]   Past Medical History:  Diagnosis Date    Aortic valve stenosis     Chronic kidney disease     Disease of thyroid gland     Dysphagia following cerebral infarction     TIA (transient ischemic attack)     Vascular dementia    [2] History reviewed. No pertinent surgical history.  [3] No family history on file.  [4] [START ON 5/31/2025] clopidogrel, 75 mg, oral, Daily  [START ON 5/31/2025] cyanocobalamin, 1,000 mcg, oral, Daily  [START ON 5/31/2025] dilTIAZem SR, 60 mg, oral, Daily  ferrous sulfate, 1 tablet, oral, BID  heparin (porcine), 5,000 Units, subcutaneous, q8h DAVION  [START ON 5/31/2025] levothyroxine, 25 mcg, oral, Daily  [START ON 5/31/2025] pantoprazole, 40 mg, oral, Daily before breakfast  perflutren lipid microspheres, 0.5-10 mL of dilution, intravenous, Once in imaging  perflutren protein A microsphere, 0.5 mL, intravenous, Once in imaging  [START ON 5/31/2025] polyethylene glycol, 17 g, oral, Daily  sulfur hexafluoride microsphr, 2 mL, intravenous, Once in imaging  [5]    [6] PRN medications: acetaminophen **OR** acetaminophen **OR** acetaminophen, acetaminophen **OR** acetaminophen **OR** acetaminophen, ondansetron ODT **OR** ondansetron

## 2025-05-31 NOTE — CONSULTS
Inpatient consult to Cardiology  Consult performed by: Aj Clark MD  Consult ordered by: Shabbir Cervantes MD  Reason for consult: AFib RVR      History Of Present Illness:    Mrs. Sandro Hamilton is a 89 y.o. female being consulted by the Cardiology team for Afib RVR. Patient with past medical history significant for hypertension, Aortic valve stenosis, Chronic kidney disease, TIA, vascular dementia, GERD, Anemia, Hypothyroidism, Coronary artery disease. Patient is a poor historian and cannot give reliable information at this point. History has been obtained from previous records. Per chart review, she was brought from nursing home to ED [facility] on 05/30/2025 due to tachycardia and elevated blood pressure. She denied chest pain, shortness of breath, leg edema, lightheadedness, headaches, fever, chills, orthopnea, paroxysmal nocturnal dyspnea or syncope. EKG showing A-fib RVR, was administered Cardizem IV with improvement in her heart rate. Trop 46 - 51. . CTA showing moderate right-sided effusion and possible pericardial effusion. She has been admitted for clinical compensation.     Last Recorded Vitals:  Vitals:    05/30/25 1715 05/30/25 1800 05/30/25 1846 05/30/25 1933   BP: 126/67 115/72 123/66 113/70   BP Location:   Left arm Right arm   Patient Position:   Lying Lying   Pulse: 94 94 97 78   Resp: 19 18 24 18   Temp:   36.9 °C (98.4 °F) 36.1 °C (97 °F)   TempSrc:   Temporal Temporal   SpO2: 100% 100% 96% 96%   Weight:    (!) 42.2 kg (93 lb 0.6 oz)   Height:    1.524 m (5')       Last Labs:  CBC - 5/30/2025:  3:46 PM  7.6 10.1 284    31.8      CMP - 5/30/2025:  3:46 PM  8.9 6.6 15 --- 0.3   _ 3.0 13 93      PTT - No results in last year.  _   _ _     Troponin I, High Sensitivity   Date/Time Value Ref Range Status   05/30/2025 05:08 PM 46 (H) 0 - 13 ng/L Final   05/30/2025 03:46 PM 51 (HH) 0 - 13 ng/L Final     BNP   Date/Time Value Ref Range Status   05/30/2025 03:46  (H) 0 - 99  "pg/mL Final      Last I/O:  No intake/output data recorded.    Past Cardiology Tests (Last 3 Years):  EKG:  ECG 12 lead 05/30/2025 (Wet Read)      ECG 12 lead 05/30/2025 (Preliminary)    Echo:  No results found for this or any previous visit from the past 1095 days.    Ejection Fractions:  No results found for: \"EF\"  Cath:  No results found for this or any previous visit from the past 1095 days.    Stress Test:  No results found for this or any previous visit from the past 1095 days.    Cardiac Imaging:  No results found for this or any previous visit from the past 1095 days.      Past Medical History:  She has a past medical history of Aortic valve stenosis, Chronic kidney disease, Disease of thyroid gland, Dysphagia following cerebral infarction, TIA (transient ischemic attack), and Vascular dementia.    Past Surgical History:  She has no past surgical history on file.      Social History:  She has no history on file for tobacco use, alcohol use, and drug use.    Family History:  Family History[1]     Allergies:  Adhesive, Amlodipine, and Toprol xl [metoprolol succinate]    Inpatient Medications:  Scheduled Medications[2]  PRN Medications[3]  Continuous Medications[4]  Outpatient Medications:  Current Outpatient Medications   Medication Instructions    cholecalciferol (VITAMIN D-3) 25 mcg, oral, Daily (0630)    clopidogrel (PLAVIX) 75 mg, Daily    cyanocobalamin (VITAMIN B-12) 1,000 mcg, Daily    FeroSuL 325 mg (65 mg iron) tablet 1 tablet, 2 times daily    hydrALAZINE (APRESOLINE) 25 mg, 3 times daily    lactose-reduced food (BOOST BREEZE NUTRITIONAL ORAL) Take by mouth. Take with meals for malnutrition    levothyroxine (Synthroid, Levoxyl) 25 mcg tablet 1 tablet, Daily (0630)    lisinopril 5 mg, 2 times daily (morning and late afternoon)    ondansetron (ZOFRAN) 4 mg, oral, Every 8 hours PRN    pantoprazole (PROTONIX) 40 mg, Daily before breakfast       Physical Exam:  General: alert, confused and in no acute " distress. Frail  HEENT: NC/AT; EOMI; PERRLA, external ear is normal  Neck: supple; trachea midline; no masses; no JVD  Chest: clear breath sounds bilaterally; no wheezing  Cardio: Irregular rhythm, S1S2 normal, systolic murmur 2+/6+ RUSB  Abdomen: Soft, non-tender, non-distension, no organomegaly  Extremities: no clubbing/cyanosis/edema     Assessment/Plan   Mrs. Sandro Hamilton is a 89 y.o. female being consulted by the Cardiology team for Afib RVR. Patient with past medical history significant for hypertension, Aortic valve stenosis, Chronic kidney disease, TIA, vascular dementia, GERD, Anemia, Hypothyroidism, Coronary artery disease. Patient is a poor historian and cannot give reliable information at this point. History has been obtained from previous records. Per chart review, she was brought from nursing home to ED [facility] on 05/30/2025 due to tachycardia and elevated blood pressure. She denied chest pain, shortness of breath, leg edema, lightheadedness, headaches, fever, chills, orthopnea, paroxysmal nocturnal dyspnea or syncope. EKG showing A-fib RVR, was administered Cardizem IV with improvement in her heart rate. Trop 46 - 51. . CTA showing moderate right-sided effusion and possible pericardial effusion. She has been admitted for clinical compensation.     Assessment    # New Onset Atrial Fibrillation RVR / HF with Preserved LV systolic function NYHA III  - Trop 46 - 51.  -   - Elevated [id number] score which implies higher risk for thromboembolic events yearly, therefore should be started on stroke prophylaxis with DOAC.  - EKG showing atrial fibrillation.  - Echo pending.  - Would suggest to keep Cardizem.  - Would suggest to switch Clopidogrel for Eliquis if the risks for bleeding are not deemed to be high. If risks are high, then keeping Clopidogrel would be reasonable.   - Follow up in Cardiology clinic.    # Hypertension  - Controlled blood pressure.  - Keep current medications  including Lisinopril 5mg BID, Hydralazine 25mg q8h.  - Patient counseled to keep a healthy lifestyle including regular exercise and low-sodium diet.  - Recommended home blood pressure monitoring.  - Goal of BP < 130/80mmHg.    # CKD  - Crea 1.9.  - BUN 45.  - Follow up with Nephrology team.    # Hypothyroidism  - Keep Levothyroxine 25mcg daily.     Thank you for allowing me to participate in the care of this patient. Please reach me out if you have any questions or if you need any clarifications regarding the patient's care.     Code Status:  Full Code    Aj Abe Clark MD  Cardiology        [1] No family history on file.  [2]   Scheduled medications   Medication Dose Route Frequency    [START ON 5/31/2025] clopidogrel  75 mg oral Daily    [START ON 5/31/2025] cyanocobalamin  1,000 mcg oral Daily    dilTIAZem CD  120 mg oral q24h    ferrous sulfate  1 tablet oral BID    furosemide  10 mg intravenous q24h    heparin (porcine)  5,000 Units subcutaneous q8h DAVION    [START ON 5/31/2025] levothyroxine  25 mcg oral Daily    [START ON 5/31/2025] pantoprazole  40 mg oral Daily before breakfast    perflutren lipid microspheres  0.5-10 mL of dilution intravenous Once in imaging    perflutren protein A microsphere  0.5 mL intravenous Once in imaging    [START ON 5/31/2025] polyethylene glycol  17 g oral Daily    sulfur hexafluoride microsphr  2 mL intravenous Once in imaging   [3]   PRN medications   Medication    acetaminophen    Or    acetaminophen    Or    acetaminophen    acetaminophen    Or    acetaminophen    Or    acetaminophen    ondansetron ODT    Or    ondansetron   [4]   Continuous Medications   Medication Dose Last Rate

## 2025-05-31 NOTE — PROGRESS NOTES
05/31/25 1530   Discharge Planning   Living Arrangements Other (Comment)   Support Systems Family members   Assistance Needed Assistane   Type of Residence Skilled nursing facility   Do you have animals or pets at home? No   Who is requesting discharge planning? Provider   Home or Post Acute Services Post acute facilities (Rehab/SNF/etc)   Type of Post Acute Facility Services Skilled nursing   Expected Discharge Disposition SNF   Does the patient need discharge transport arranged? Yes   RoundTrip coordination needed? Yes   Has discharge transport been arranged? No   What day is the transport expected? 06/02/25   Patient Choice   Provider Choice list and CMS website (https://medicare.gov/care-compare#search) for post-acute Quality and Resource Measure Data were provided and reviewed with: Family   Patient / Family choosing to utilize agency / facility established prior to hospitalization Yes   Stroke Family Assessment   Stroke Family Assessment Needed No   Intensity of Service   Intensity of Service 0-30 min     SW reviewed chart and attempted to meet with pt to complete the assessment. She is disoriented and unable to participate in assessment.  Call placed to nephew/legal guardian/Kong Kunz [phone] who was educated to the SERGIO roles in the discharge planning process. Prior to hospitalization, pt had been convalescing at [facility] since approx May 10, 2025 following a hospital stay at [facility]. She had been living independently in [place], [place] prior to the hospital at [facility] with caregivers checking in on her daily throughout the day. According to nephew, she was making progress with therapies until this week when she had a decline.  Nephew reports hospice has been recommened on several occasions, but pt always bounces back.  For now, he would like for her to return to [facility] for skilled care. We reviewed the IM without questions/concerns- copy added to pt's chart. Return referral  sent to [facility]. Care Transitions will continue to follow SARAH Hobbs

## 2025-05-31 NOTE — PROGRESS NOTES
Sandro Hamilton is a 89 y.o. female on day 0 of admission presenting with New onset atrial fibrillation (Multi).      Subjective   Patient seen and examined at the bedside  She is resting fairly comfortably in bed  She states she is not feeling well at all  She is having nausea and abdominal pain  She is not eating well       Objective     Last Recorded Vitals  /70 (BP Location: Right arm, Patient Position: Lying)   Pulse 75   Temp 36 °C (96.8 °F) (Temporal)   Resp 18   Wt (!) 42.2 kg (93 lb 0.6 oz)   SpO2 94%   Intake/Output last 3 Shifts:    Intake/Output Summary (Last 24 hours) at 5/31/2025 1310  Last data filed at 5/31/2025 0638  Gross per 24 hour   Intake --   Output 375 ml   Net -375 ml       Admission Weight  Weight: (!) 38.6 kg (85 lb) (05/30/25 1525)    Daily Weight  05/30/25 : (!) 42.2 kg (93 lb 0.6 oz)    Image Results  ECG 12 lead  Atrial fibrillation with rapid ventricular response  Left axis deviation  Nonspecific ST and T wave abnormality  Abnormal ECG  When compared with ECG of 27-APR-2021 09:01,  Previous ECG has undetermined rhythm, needs review  T wave inversion no longer evident in Inferior leads  T wave inversion no longer evident in Lateral leads  XR chest 1 view  Narrative: Interpreted By:  Jaci Walsh,   STUDY:  XR CHEST 1 VIEW;  5/30/2025 3:25 pm      INDICATION:  Signs/Symptoms:new onset afib.      COMPARISON:  04/27/2021      ACCESSION NUMBER(S):  [id number]      ORDERING CLINICIAN:  OLLIE GARCIA      FINDINGS:  AP radiograph of the chest was provided.              CARDIOMEDIASTINAL SILHOUETTE:  Interval increase in cardiomegaly/pericardial effusion.      LUNGS:  New moderate right and small left pleural effusions with superimposed  atelectasis/infiltrate. No pneumothorax.      ABDOMEN:  No remarkable upper abdominal findings.      BONES:  No acute osseous changes.      Impression: Interval increase in cardiomegaly/pericardial effusion with new  moderate right and small  left pleural effusions with superimposed  atelectasis/infiltrate.      Signed by: Jaci Walsh 5/30/2025 3:37 PM  Dictation workstation:   [id number]      Physical Exam  Constitutional:       Appearance: She is ill-appearing.   HENT:      Head: Normocephalic and atraumatic.      Right Ear: External ear normal.      Left Ear: External ear normal.      Nose: Nose normal.      Mouth/Throat:      Mouth: Mucous membranes are moist.      Pharynx: Oropharynx is clear.   Eyes:      Extraocular Movements: Extraocular movements intact.      Conjunctiva/sclera: Conjunctivae normal.      Pupils: Pupils are equal, round, and reactive to light.   Cardiovascular:      Rate and Rhythm: Normal rate. Rhythm irregular.      Heart sounds: Murmur heard.   Pulmonary:      Effort: Pulmonary effort is normal.      Breath sounds: Normal breath sounds.   Abdominal:      General: Abdomen is flat.      Palpations: Abdomen is soft.   Skin:     General: Skin is warm and dry.   Neurological:      General: No focal deficit present.      Mental Status: She is alert and oriented to person, place, and time.   Psychiatric:         Mood and Affect: Mood normal.         Behavior: Behavior normal.         Relevant Results                              Assessment & Plan  New onset atrial fibrillation (Multi)    Disease of thyroid gland    A-fib (Multi)    Atrial fibrillation  Chronic kidney disease stage IV  Severe aortic stenosis  Advanced age  Dementia  Abdominal pain with nausea  Malnourished with cachexia    Plan:  Her CODE STATUS is a DNR comfort care  There is already been determined that there is really nothing that can be done when it comes to aortic stenosis  I will cancel the echo  I will consult hospice  CODE STATUS is already a DNR comfort care  We will have palliative/hospice talk with family about goals and how to keep her comfortable through this process             Brandon Baker, DO

## 2025-06-01 VITALS
WEIGHT: 93.03 LBS | TEMPERATURE: 97.3 F | HEART RATE: 94 BPM | OXYGEN SATURATION: 96 % | SYSTOLIC BLOOD PRESSURE: 111 MMHG | RESPIRATION RATE: 18 BRPM | BODY MASS INDEX: 18.27 KG/M2 | DIASTOLIC BLOOD PRESSURE: 74 MMHG | HEIGHT: 60 IN

## 2025-06-01 PROBLEM — I48.91 A-FIB (MULTI): Status: RESOLVED | Noted: 2025-05-30 | Resolved: 2025-06-01

## 2025-06-01 PROBLEM — I48.91 NEW ONSET ATRIAL FIBRILLATION (MULTI): Status: RESOLVED | Noted: 2025-05-30 | Resolved: 2025-06-01

## 2025-06-01 LAB
Q ONSET: 224 MS
Q ONSET: 226 MS
QRS COUNT: 19 BEATS
QRS COUNT: 23 BEATS
QRS DURATION: 82 MS
QRS DURATION: 86 MS
QT INTERVAL: 266 MS
QT INTERVAL: 328 MS
QTC CALCULATION(BAZETT): 401 MS
QTC CALCULATION(BAZETT): 443 MS
QTC FREDERICIA: 350 MS
QTC FREDERICIA: 401 MS
R AXIS: -31 DEGREES
R AXIS: -32 DEGREES
SARS-COV-2 RNA RESP QL NAA+PROBE: NOT DETECTED
T AXIS: -13 DEGREES
T AXIS: 97 DEGREES
T OFFSET: 357 MS
T OFFSET: 390 MS
VENTRICULAR RATE: 110 BPM
VENTRICULAR RATE: 137 BPM

## 2025-06-01 PROCEDURE — 87635 SARS-COV-2 COVID-19 AMP PRB: CPT | Performed by: INTERNAL MEDICINE

## 2025-06-01 PROCEDURE — 99238 HOSP IP/OBS DSCHRG MGMT 30/<: CPT | Performed by: INTERNAL MEDICINE

## 2025-06-01 PROCEDURE — 99233 SBSQ HOSP IP/OBS HIGH 50: CPT | Performed by: STUDENT IN AN ORGANIZED HEALTH CARE EDUCATION/TRAINING PROGRAM

## 2025-06-01 PROCEDURE — 99222 1ST HOSP IP/OBS MODERATE 55: CPT | Performed by: INTERNAL MEDICINE

## 2025-06-01 PROCEDURE — 2500000004 HC RX 250 GENERAL PHARMACY W/ HCPCS (ALT 636 FOR OP/ED): Performed by: HOSPITALIST

## 2025-06-01 PROCEDURE — 2500000001 HC RX 250 WO HCPCS SELF ADMINISTERED DRUGS (ALT 637 FOR MEDICARE OP): Performed by: HOSPITALIST

## 2025-06-01 RX ORDER — DILTIAZEM HYDROCHLORIDE 120 MG/1
120 CAPSULE, EXTENDED RELEASE ORAL EVERY 24 HOURS
Qty: 30 CAPSULE | Refills: 0 | Status: SHIPPED | OUTPATIENT
Start: 2025-06-01

## 2025-06-01 RX ADMIN — POLYETHYLENE GLYCOL 3350 17 G: 17 POWDER, FOR SOLUTION ORAL at 08:42

## 2025-06-01 RX ADMIN — LEVOTHYROXINE SODIUM 25 MCG: 25 TABLET ORAL at 06:06

## 2025-06-01 RX ADMIN — FERROUS SULFATE TAB 325 MG (65 MG ELEMENTAL FE) 1 TABLET: 325 (65 FE) TAB at 08:42

## 2025-06-01 RX ADMIN — HEPARIN SODIUM 5000 UNITS: 5000 INJECTION, SOLUTION INTRAVENOUS; SUBCUTANEOUS at 06:06

## 2025-06-01 RX ADMIN — PANTOPRAZOLE SODIUM 40 MG: 40 TABLET, DELAYED RELEASE ORAL at 06:06

## 2025-06-01 RX ADMIN — CLOPIDOGREL 75 MG: 75 TABLET ORAL at 08:42

## 2025-06-01 RX ADMIN — Medication 1000 MCG: at 08:42

## 2025-06-01 ASSESSMENT — COGNITIVE AND FUNCTIONAL STATUS - GENERAL
DRESSING REGULAR UPPER BODY CLOTHING: A LOT
HELP NEEDED FOR BATHING: A LOT
MOVING TO AND FROM BED TO CHAIR: A LOT
DRESSING REGULAR LOWER BODY CLOTHING: A LOT
WALKING IN HOSPITAL ROOM: A LOT
DAILY ACTIVITIY SCORE: 12
TOILETING: A LOT
PERSONAL GROOMING: A LOT
CLIMB 3 TO 5 STEPS WITH RAILING: A LOT
MOBILITY SCORE: 12
EATING MEALS: A LOT
STANDING UP FROM CHAIR USING ARMS: A LOT
MOVING FROM LYING ON BACK TO SITTING ON SIDE OF FLAT BED WITH BEDRAILS: A LOT
TURNING FROM BACK TO SIDE WHILE IN FLAT BAD: A LOT

## 2025-06-01 ASSESSMENT — PAIN SCALES - GENERAL: PAINLEVEL_OUTOF10: 0 - NO PAIN

## 2025-06-01 ASSESSMENT — PAIN - FUNCTIONAL ASSESSMENT: PAIN_FUNCTIONAL_ASSESSMENT: 0-10

## 2025-06-01 NOTE — DISCHARGE SUMMARY
Discharge Diagnosis  New onset atrial fibrillation (Multi)           Issues Requiring Follow-Up  She is being discharged to [facility], [place].  During her hospitalization here they have met with Dr. Waite with palliative care/hospice and he will be following with them closely there    Discharge Meds     Medication List      START taking these medications     dilTIAZem  mg 24 hr capsule; Commonly known as: Tiazac; Take 1   capsule (120 mg) by mouth once every 24 hours.     CONTINUE taking these medications     BOOST BREEZE NUTRITIONAL ORAL   cholecalciferol 25 mcg (1,000 units) tablet; Commonly known as: Vitamin   D-3   clopidogrel 75 mg tablet; Commonly known as: Plavix   cyanocobalamin 1,000 mcg tablet; Commonly known as: Vitamin B-12   FeroSuL 325 mg (65 mg iron) tablet; Generic drug: ferrous sulfate   hydrALAZINE 25 mg tablet; Commonly known as: Apresoline   levothyroxine 25 mcg tablet; Commonly known as: Synthroid, Levoxyl   lisinopril 5 mg tablet   ondansetron 4 mg tablet; Commonly known as: Zofran   pantoprazole 40 mg EC tablet; Commonly known as: ProtoNix       Test Results Pending At Discharge  Pending Labs       Order Current Status    Extra Urine Gray Tube Collected (05/30/25 1633)    Urinalysis with Reflex Culture and Microscopic In process            Hospital Course   She presented to the emergency room and was found to be in atrial fibrillation.  Today she is still in atrial fibrillation she is irregular however her heart rate is well-controlled on Cardizem.  She has a history of severe aortic stenosis and heart disease and has already been told that she is not a candidate for intervention for this.  She also had some nausea and abdominal pain this has all resolved at this time  This a.m. when I saw her she is eating breakfast she states she is feeling much better and she stated clearly that she wanted to get out of the hospital  She is being discharged today to [place] with skilled.   I do appreciate Dr. Waite input as he met with son and discussed goals of care and plan of care.  She is a DNR with no major options for intervention with some very severe metabolic disease that includes chronic kidney disease with severe heart disease and advanced age.  At this time she is going to be followed closely with palliative care with a plan to go to comfort measures in the near future  At this time vitals are stable she is feeling well and wants to get out of the hospital      Pertinent Physical Exam At Time of Discharge  Physical Exam  Constitutional:       Appearance: She is ill-appearing.   HENT:      Head: Normocephalic and atraumatic.      Right Ear: External ear normal.      Left Ear: External ear normal.      Nose: Nose normal.      Mouth/Throat:      Mouth: Mucous membranes are moist.      Pharynx: Oropharynx is clear.   Eyes:      Extraocular Movements: Extraocular movements intact.      Conjunctiva/sclera: Conjunctivae normal.      Pupils: Pupils are equal, round, and reactive to light.   Cardiovascular:      Rate and Rhythm: Normal rate. Rhythm irregular.      Heart sounds: Murmur heard.   Pulmonary:      Effort: Pulmonary effort is normal.      Breath sounds: Normal breath sounds.   Abdominal:      General: Abdomen is flat.      Palpations: Abdomen is soft.   Skin:     General: Skin is warm and dry.   Neurological:      General: No focal deficit present.      Mental Status: She is alert and oriented to person, place, and time.   Psychiatric:         Mood and Affect: Mood normal.         Behavior: Behavior normal.         Outpatient Follow-Up  No future appointments.      Brandon Baker, DO

## 2025-06-01 NOTE — PROGRESS NOTES
Patient is ready for discharge today,so SW contacted [facility] to confirm she can return there. Also will need Covid- so did let RN know. Called and spoke with Patients family Kofi to let him know that she will return to [facility] this afternoon.  1216- called family member Kofi to inform him that  time is for 1400 today. Also sent message to [facility] to inform time via care port.    LARISSA Nascimento

## 2025-06-01 NOTE — CONSULTS
Reason For Consult  Advanced Care Planning     History Of Present Illness   This is a pleasant 89-year-old female with a past medical history of vascular dementia, severe aortic stenosis, chronic kidney disease with a baseline creatinine around 1.8, atrial fibrillation, hypertension, TIAs, coronary artery disease, hypothyroidism, GERD, and chronic anemia who was sent from [facility] nursing [facility] to the emergency room for rapid heart rate and blood pressure increase.  Patient was found to have atrial fibrillation with rapid ventricular rate.  Imaging showed right-sided pleural effusion with a possible pericardial effusion.  Patient was admitted to the medical service.  Cardiology consulted.  Medical therapy was optimized.  Prognosis deemed to be guarded given her underlying comorbidities.  Palliative care consulted for advance care planning.  Upon encounter, patient resting comfortably in her bed.  Awake and alert.  She is requiring assistance with all of her ADLs including feeding.  She is incoherent.  Oriented only to self.  She is a poor historian.    Oriented only to self.  She needs to be fed.  Talked to POA.  She was living by herself at home.  Was still independent ADLs.  She had a hospitalization for pneumonia.  SNF after that.  They do note that she does have a history of aortic stenosis and renal failure.  It has been there for so many years.  She declined surgery.  The next plan now is to go back to skilled for services and skilled again.  After that, she will go back home in [place] with 24/7 home health care and possible hospice       Review of Systems  10 systems were reviewed and were negative except for those noted in the history of present illness.     Past Medical History  She has a past medical history of Aortic valve stenosis, Chronic kidney disease, Disease of thyroid gland, Dysphagia following cerebral infarction, TIA (transient ischemic attack), and Vascular dementia.  Pertinent  medical history also documented in my above narrative    Surgical History  She has no past surgical history on file.  Pertinent surgical history also documented in my above narrative     Social History  She has no history on file for tobacco use, alcohol use, and drug use.    Family History  Family History[1]     Medications  Current Outpatient Medications   Medication Instructions    cholecalciferol (VITAMIN D-3) 25 mcg, oral, Daily (0630)    clopidogrel (PLAVIX) 75 mg, Daily    cyanocobalamin (VITAMIN B-12) 1,000 mcg, Daily    FeroSuL 325 mg (65 mg iron) tablet 1 tablet, 2 times daily    hydrALAZINE (APRESOLINE) 25 mg, 3 times daily    lactose-reduced food (BOOST BREEZE NUTRITIONAL ORAL) Take by mouth. Take with meals for malnutrition    levothyroxine (Synthroid, Levoxyl) 25 mcg tablet 1 tablet, Daily (0630)    lisinopril 5 mg, 2 times daily (morning and late afternoon)    ondansetron (ZOFRAN) 4 mg, oral, Every 8 hours PRN    pantoprazole (PROTONIX) 40 mg, Daily before breakfast       Allergies  Adhesive, Amlodipine, and Toprol xl [metoprolol succinate]    Current Hospital Medications  Current Medications[2]       Physical Exam  Physical Exam  Constitutional:       General: She is not in acute distress.     Appearance: She is not ill-appearing.      Comments: Awake alert and oriented x1.  She is very frail looking and cachectic   HENT:      Ears:      Comments: Hearing loss     Mouth/Throat:      Pharynx: Oropharynx is clear.   Eyes:      Pupils: Pupils are equal, round, and reactive to light.   Cardiovascular:      Rate and Rhythm: Normal rate. Rhythm irregular.      Heart sounds: Murmur heard.   Pulmonary:      Effort: No respiratory distress.      Breath sounds: Normal breath sounds. No wheezing or rhonchi.   Abdominal:      General: Abdomen is flat. Bowel sounds are normal. There is no distension.      Palpations: Abdomen is soft.      Tenderness: There is no abdominal tenderness.   Musculoskeletal:          General: No swelling.   Skin:     General: Skin is dry.      Coloration: Skin is pale.   Neurological:      General: No focal deficit present.   Psychiatric:         Mood and Affect: Mood normal.         Behavior: Behavior normal.           Last Recorded Vitals  Blood pressure 111/74, pulse 94, temperature 36.3 °C (97.3 °F), resp. rate 18, height 1.524 m (5'), weight (!) 42.2 kg (93 lb 0.6 oz), SpO2 96%.    Relevant Results  Results for orders placed or performed during the hospital encounter of 05/30/25 (from the past 24 hours)   ECG 12 Lead   Result Value Ref Range    Ventricular Rate 110 BPM    QRS Duration 82 ms    QT Interval 328 ms    QTC Calculation(Bazett) 443 ms    R Axis -32 degrees    T Axis -13 degrees    QRS Count 19 beats    Q Onset 226 ms    T Offset 390 ms    QTC Fredericia 401 ms        Imaging  Imaging  XR chest 1 view  Result Date: 5/30/2025  Interval increase in cardiomegaly/pericardial effusion with new moderate right and small left pleural effusions with superimposed atelectasis/infiltrate.   Signed by: Jaci Walsh 5/30/2025 3:37 PM Dictation workstation:   [id number]      Cardiology, Vascular, and Other Imaging            Assessment & Plan  New onset atrial fibrillation (Multi)    Disease of thyroid gland    A-fib (Multi)  This is a pleasant 89-year-old female with a past medical history of vascular dementia, severe aortic stenosis, chronic kidney disease with a baseline creatinine around 1.8, atrial fibrillation, hypertension, TIAs, coronary artery disease, hypothyroidism, GERD, and chronic anemia who was sent from [facility] nursing [facility] to the emergency room for rapid heart rate and blood pressure increase.  Patient was found to have atrial fibrillation with rapid ventricular rate.  Imaging showed right-sided pleural effusion with a possible pericardial effusion.  Patient was admitted to the medical service.  Cardiology consulted.  Medical therapy was optimized.  Prognosis  deemed to be guarded given her underlying comorbidities.  Palliative care consulted for advance care planning.      I went ahead and called the patient's POA/guardian Kong [phone].  I started the meeting by introducing the practice of palliative care and the services it provides.  I then reviewed at length with Kong the clinical diagnosis/medical problems that the patient presented with and the treatments provided so far. We discussed the implications of the current circumstances and option plans going forward.  We then reviewed at length the nature of the patient's primary disease--aortic stenosis, its progression, and potential complications in the long run.  Kong demonstrated good understanding of all the information I provided.  He said that he had this aortic stenosis for so many years now.  3 years ago, patient was told that she only had 6 months to 1 year but she remained stable.  She was living by herself at home up until a month ago when she was hospitalized for pneumonia and UTI.  She was discharged after that to skilled nursing facility for rehab.  She has been showing some progress but a slow pace.  She has been having worsening confusion.  Kong said that the ultimate plan is to send the patient back to her house and they are planning to provide her with 24/7 care.  His wishes still to try to have the patient go back to skilled nursing facility to continue rehab.  I demonstrated understanding.  In such a case, we can try to send the patient back to rehab with palliative care follow-up.  She does remain still a high risk of further decompensation and hospitalization.  He demonstrated understanding.  On the other hand, I told him that another good option would be considering hospice care.  I introduced the philosophy of hospice care and the services it provides.  I explained how hospice attends to patient's quality of life and dignity while the disease takes its natural course.  I emphasized how  hospice focuses on decreasing the burden of the disease and providing comfort not only for the patient but also for the family and caregivers.    Kong demonstrated understanding of all the information I provided.  The plan now is to take her back to skilled nursing facility to continue trying rehab along with palliative care follow-up.    I spent a total of 75 minutes on the date of the service which included: preparing to see the patient,  obtaining and/or reviewing separately obtained history, face-to-face patient care, performing a medically appropriate examination, counseling and educating the patient/family/caregiver, ordering medications/tests, independently interpreting results (not separately reported), communicating results to the patient/family caregiver, and completing clinical documentation.           (This note was generated with voice recognition software and may contain errors including spelling, grammar, syntax and misrecognition of what was dictated, that are not fully corrected)     Bobby Le MD         [1] No family history on file.  [2]   Current Facility-Administered Medications:     acetaminophen (Tylenol) tablet 650 mg, 650 mg, oral, q4h PRN **OR** acetaminophen (Tylenol) oral liquid 650 mg, 650 mg, nasogastric tube, q4h PRN **OR** acetaminophen (Tylenol) suppository 650 mg, 650 mg, rectal, q4h PRN, Shabbir Cervantes MD    acetaminophen (Tylenol) tablet 650 mg, 650 mg, oral, q4h PRN **OR** acetaminophen (Tylenol) oral liquid 650 mg, 650 mg, oral, q4h PRN **OR** acetaminophen (Tylenol) suppository 650 mg, 650 mg, rectal, q4h PRN, Shabbir Cervantes MD    clopidogrel (Plavix) tablet 75 mg, 75 mg, oral, Daily, Shabbir Cervantes MD, 75 mg at 06/01/25 0842    cyanocobalamin (Vitamin B-12) tablet 1,000 mcg, 1,000 mcg, oral, Daily, Shabbir Cervantes MD, 1,000 mcg at 06/01/25 0842    dilTIAZem CD (Cardizem CD) 24 hr capsule 120 mg, 120 mg, oral, q24h, Shabbir Cervantes MD, 120 mg at 05/31/25 1727    ferrous  sulfate 325 mg (65 mg elemental) tablet 1 tablet, 1 tablet, oral, BID, Shabbir Cervantes MD, 1 tablet at 06/01/25 0842    heparin (porcine) injection 5,000 Units, 5,000 Units, subcutaneous, q8h DAVION, Shabbir Cervantes MD, 5,000 Units at 06/01/25 0606    levothyroxine (Synthroid, Levoxyl) tablet 25 mcg, 25 mcg, oral, Daily, Shabbir Cervantes MD, 25 mcg at 06/01/25 0606    ondansetron ODT (Zofran-ODT) disintegrating tablet 4 mg, 4 mg, oral, q8h PRN **OR** ondansetron (Zofran) injection 4 mg, 4 mg, intravenous, q8h PRN, Shabbir Cervantes MD, 4 mg at 05/31/25 0958    pantoprazole (ProtoNix) EC tablet 40 mg, 40 mg, oral, Daily before breakfast, Shabbir Cervantes MD, 40 mg at 06/01/25 0606    polyethylene glycol (Glycolax, Miralax) packet 17 g, 17 g, oral, Daily, Shabbir Cervantes MD, 17 g at 06/01/25 0842

## 2025-06-01 NOTE — CARE PLAN
The clinical goals for the shift include No SOB or AFIB symptoms this shift      Problem: Pain - Adult  Goal: Verbalizes/displays adequate comfort level or baseline comfort level  Outcome: Progressing     Problem: Safety - Adult  Goal: Free from fall injury  Outcome: Progressing     Problem: Discharge Planning  Goal: Discharge to home or other facility with appropriate resources  Outcome: Progressing     Problem: Chronic Conditions and Co-morbidities  Goal: Patient's chronic conditions and co-morbidity symptoms are monitored and maintained or improved  Outcome: Progressing     Problem: Nutrition  Goal: Nutrient intake appropriate for maintaining nutritional needs  Outcome: Progressing     Problem: Skin  Goal: Decreased wound size/increased tissue granulation at next dressing change  Outcome: Progressing  Flowsheets (Taken 5/31/2025 1609)  Decreased wound size/increased tissue granulation at next dressing change: Protective dressings over bony prominences  Goal: Participates in plan/prevention/treatment measures  Outcome: Progressing  Flowsheets (Taken 5/31/2025 1609)  Participates in plan/prevention/treatment measures: Elevate heels  Goal: Prevent/manage excess moisture  Outcome: Progressing  Flowsheets (Taken 6/1/2025 0942)  Prevent/manage excess moisture: Moisturize dry skin  Goal: Prevent/minimize sheer/friction injuries  Outcome: Progressing  Flowsheets (Taken 6/1/2025 0942)  Prevent/minimize sheer/friction injuries: Turn/reposition every 2 hours/use positioning/transfer devices  Goal: Promote/optimize nutrition  Outcome: Progressing  Flowsheets (Taken 6/1/2025 0942)  Promote/optimize nutrition:   Monitor/record intake including meals   Assist with feeding  Goal: Promote skin healing  Outcome: Progressing  Flowsheets (Taken 6/1/2025 0942)  Promote skin healing: Turn/reposition every 2 hours/use positioning/transfer devices

## 2025-06-01 NOTE — PROGRESS NOTES
"Subjective Data:  Patient reports feeling well, no new adverse events overnight. Patient denies any chest pain, shortness of breath, palpitations, dizziness or syncope. Patient is hemodynamically stable.     Overnight Events:    No     Objective Data:  Last Recorded Vitals:  Vitals:    05/31/25 1100 05/31/25 1500 05/31/25 2100 06/01/25 0700   BP: 118/70 138/85 105/56 111/74   BP Location: Right arm Right arm Right arm    Patient Position: Lying Lying Lying    Pulse: 75 99 75 94   Resp: 18 18 18 18   Temp: 36 °C (96.8 °F) 36.7 °C (98.1 °F) 36.6 °C (97.9 °F) 36.3 °C (97.3 °F)   TempSrc: Temporal Temporal Temporal    SpO2: 94% 98% 98% 96%   Weight:       Height:           Last Labs:  CBC - 5/31/2025:  4:15 AM  6.2 9.7 257    31.1      CMP - 5/31/2025:  4:14 AM  8.8 5.9 15 --- 0.3   _ 2.9 11 81      PTT - No results in last year.  _   _ _     TROPHS   Date/Time Value Ref Range Status   05/30/2025 05:08 PM 46 0 - 13 ng/L Final   05/30/2025 03:46 PM 51 0 - 13 ng/L Final     BNP   Date/Time Value Ref Range Status   05/30/2025 03:46  0 - 99 pg/mL Final      Last I/O:  I/O last 3 completed shifts:  In: 150 (3.6 mL/kg) [P.O.:150]  Out: 475 (11.3 mL/kg) [Urine:475 (0.3 mL/kg/hr)]  Weight: 42.2 kg     Past Cardiology Tests (Last 3 Years):  EKG:  ECG 12 Lead 05/31/2025 (Preliminary)      ECG 12 lead 05/30/2025 (Wet Read)      ECG 12 lead 05/30/2025 (Preliminary)    Echo:  No results found for this or any previous visit from the past 1095 days.    Ejection Fractions:  No results found for: \"EF\"  Cath:  No results found for this or any previous visit from the past 1095 days.    Stress Test:  No results found for this or any previous visit from the past 1095 days.    Cardiac Imaging:  No results found for this or any previous visit from the past 1095 days.      Inpatient Medications:  Scheduled Medications[1]  PRN Medications[2]  Continuous Medications[3]    Physical Exam:  General: alert, confused and in no acute distress. " Frail  HEENT: NC/AT; EOMI; PERRLA, external ear is normal  Neck: supple; trachea midline; no masses; no JVD  Chest: clear breath sounds bilaterally; no wheezing  Cardio: Irregular rhythm, S1S2 normal, systolic murmur 2+/6+ RUSB  Abdomen: Soft, non-tender, non-distension, no organomegaly  Extremities: no clubbing/cyanosis/edema     Assessment/Plan     Mrs. Sandro Hamilton is a 89 y.o. female being consulted by the Cardiology team for Afib RVR. Patient with past medical history significant for hypertension, Aortic valve stenosis, Chronic kidney disease, TIA, vascular dementia, GERD, Anemia, Hypothyroidism, Coronary artery disease. Patient is a poor historian and cannot give reliable information at this point. History has been obtained from previous records. Per chart review, she was brought from nursing home to ED [facility] on 05/30/2025 due to tachycardia and elevated blood pressure. She denied chest pain, shortness of breath, leg edema, lightheadedness, headaches, fever, chills, orthopnea, paroxysmal nocturnal dyspnea or syncope. EKG showing A-fib RVR, was administered Cardizem IV with improvement in her heart rate. Trop 46 - 51. . CTA showing moderate right-sided effusion and possible pericardial effusion. She has been admitted for clinical compensation.     Assessment     # New Onset Atrial Fibrillation RVR / HF with Preserved LV systolic function NYHA III  - Trop 46 - 51.  -   - Elevated [id number] score which implies higher risk for thromboembolic events yearly, therefore should be started on stroke prophylaxis with DOAC.  - EKG showing atrial fibrillation.  - Echo pending.  - Would suggest to switch Cardizem IV drip to PO - Cardizem CD 120mg daily.  - Would suggest to address thyroid function.  - Would suggest to switch Clopidogrel for Eliquis if the risks for bleeding are not deemed to be high. If risks are high, then keeping Clopidogrel would be reasonable.   - Due to patient's age and  frailty, a rate control strategy would be a reasonable option at this point.  - Follow up in Cardiology clinic.     # Hypertension  - Controlled blood pressure.  - Keep current medications including Lisinopril 5mg BID, Hydralazine 25mg q8h.  - Patient counseled to keep a healthy lifestyle including regular exercise and low-sodium diet.  - Recommended home blood pressure monitoring.  - Goal of BP < 130/80mmHg.     # CKD  - Crea 1.9. - 1.79   - BUN 45 - 45.  - Na 142.  - K 4.7.  - Follow up with Nephrology team.     # Hypothyroidism  - Would suggest to address thyroid function.  - TSH 2.28.  - Keep Levothyroxine 25mcg daily.      Thank you for allowing me to participate in the care of this patient. Please reach me out if you have any questions or if you need any clarifications regarding the patient's care.    Peripheral IV 05/30/25 20 G Right Antecubital (Active)   Site Assessment Clean;Dry 06/01/25 0900   Dressing Type Transparent 06/01/25 0900   Line Status Saline locked;Occluded 06/01/25 0900   Dressing Status Old drainage;Dry 06/01/25 0900   Number of days: 2     Code Status:  DNR Comfort Measures Only    Aj Clark MD  Cardiology        [1]   Scheduled medications   Medication Dose Route Frequency    clopidogrel  75 mg oral Daily    cyanocobalamin  1,000 mcg oral Daily    dilTIAZem CD  120 mg oral q24h    ferrous sulfate  1 tablet oral BID    heparin (porcine)  5,000 Units subcutaneous q8h DAVION    levothyroxine  25 mcg oral Daily    pantoprazole  40 mg oral Daily before breakfast    polyethylene glycol  17 g oral Daily   [2]   PRN medications   Medication    acetaminophen    Or    acetaminophen    Or    acetaminophen    acetaminophen    Or    acetaminophen    Or    acetaminophen    ondansetron ODT    Or    ondansetron   [3]   Continuous Medications   Medication Dose Last Rate

## 2025-06-01 NOTE — NURSING NOTE
Pt left with physicians addisonette to [facility]. Report called to [facility]. Pt was there prior to this admission.  No further needs at this time.

## 2025-06-01 NOTE — CARE PLAN
The clinical goals for the shift include pt will have no sob or dizziness this shift    Over the shift, the patient did not make progress toward the following goals. Barriers to progression include weekness.       Problem: Pain - Adult  Goal: Verbalizes/displays adequate comfort level or baseline comfort level  Outcome: Progressing     Problem: Safety - Adult  Goal: Free from fall injury  Outcome: Progressing     Problem: Discharge Planning  Goal: Discharge to home or other facility with appropriate resources  Outcome: Progressing     Problem: Chronic Conditions and Co-morbidities  Goal: Patient's chronic conditions and co-morbidity symptoms are monitored and maintained or improved  Outcome: Progressing     Problem: Nutrition  Goal: Nutrient intake appropriate for maintaining nutritional needs  Outcome: Progressing     Problem: Skin  Goal: Decreased wound size/increased tissue granulation at next dressing change  Outcome: Progressing  Goal: Participates in plan/prevention/treatment measures  Outcome: Progressing  Goal: Prevent/manage excess moisture  Outcome: Progressing  Goal: Prevent/minimize sheer/friction injuries  6/1/2025 0318 by Carmen Serrano RN  Outcome: Progressing  6/1/2025 0317 by Carmen Serrano RN  Flowsheets (Taken 6/1/2025 0317)  Prevent/minimize sheer/friction injuries:   Increase activity/out of bed for meals   Turn/reposition every 2 hours/use positioning/transfer devices  Goal: Promote/optimize nutrition  Outcome: Progressing  Goal: Promote skin healing  Outcome: Progressing

## 2025-06-01 NOTE — ASSESSMENT & PLAN NOTE
This is a pleasant 89-year-old female with a past medical history of vascular dementia, severe aortic stenosis, chronic kidney disease with a baseline creatinine around 1.8, atrial fibrillation, hypertension, TIAs, coronary artery disease, hypothyroidism, GERD, and chronic anemia who was sent from [facility] nursing [facility] to the emergency room for rapid heart rate and blood pressure increase.  Patient was found to have atrial fibrillation with rapid ventricular rate.  Imaging showed right-sided pleural effusion with a possible pericardial effusion.  Patient was admitted to the medical service.  Cardiology consulted.  Medical therapy was optimized.  Prognosis deemed to be guarded given her underlying comorbidities.  Palliative care consulted for advance care planning.      I went ahead and called the patient's POA/guardian Kong [phone].  I started the meeting by introducing the practice of palliative care and the services it provides.  I then reviewed at length with Kong the clinical diagnosis/medical problems that the patient presented with and the treatments provided so far. We discussed the implications of the current circumstances and option plans going forward.  We then reviewed at length the nature of the patient's primary disease--aortic stenosis, its progression, and potential complications in the long run.  Kong demonstrated good understanding of all the information I provided.  He said that he had this aortic stenosis for so many years now.  3 years ago, patient was told that she only had 6 months to 1 year but she remained stable.  She was living by herself at home up until a month ago when she was hospitalized for pneumonia and UTI.  She was discharged after that to skilled nursing facility for rehab.  She has been showing some progress but a slow pace.  She has been having worsening confusion.  Kong said that the ultimate plan is to send the patient back to her house and they are planning  to provide her with 24/7 care.  His wishes still to try to have the patient go back to skilled nursing facility to continue rehab.  I demonstrated understanding.  In such a case, we can try to send the patient back to rehab with palliative care follow-up.  She does remain still a high risk of further decompensation and hospitalization.  He demonstrated understanding.  On the other hand, I told him that another good option would be considering hospice care.  I introduced the philosophy of hospice care and the services it provides.  I explained how hospice attends to patient's quality of life and dignity while the disease takes its natural course.  I emphasized how hospice focuses on decreasing the burden of the disease and providing comfort not only for the patient but also for the family and caregivers.    Kong demonstrated understanding of all the information I provided.  The plan now is to take her back to skilled nursing facility to continue trying rehab along with palliative care follow-up.    I spent a total of 75 minutes on the date of the service which included: preparing to see the patient,  obtaining and/or reviewing separately obtained history, face-to-face patient care, performing a medically appropriate examination, counseling and educating the patient/family/caregiver, ordering medications/tests, independently interpreting results (not separately reported), communicating results to the patient/family caregiver, and completing clinical documentation.

## 2025-06-04 LAB
Q ONSET: 228 MS
QRS COUNT: 14 BEATS
QRS DURATION: 76 MS
QT INTERVAL: 344 MS
QTC CALCULATION(BAZETT): 425 MS
QTC FREDERICIA: 396 MS
R AXIS: -25 DEGREES
T AXIS: 56 DEGREES
T OFFSET: 400 MS
VENTRICULAR RATE: 92 BPM

## 2025-06-05 ENCOUNTER — LAB REQUISITION (OUTPATIENT)
Dept: LAB | Facility: HOSPITAL | Age: 89
End: 2025-06-05
Payer: MEDICARE

## 2025-06-05 DIAGNOSIS — D50.9 IRON DEFICIENCY ANEMIA, UNSPECIFIED: ICD-10-CM

## 2025-06-05 DIAGNOSIS — I13.10 HYPERTENSIVE HEART AND CHRONIC KIDNEY DISEASE WITHOUT HEART FAILURE, WITH STAGE 1 THROUGH STAGE 4 CHRONIC KIDNEY DISEASE, OR UNSPECIFIED CHRONIC KIDNEY DISEASE: ICD-10-CM

## 2025-06-05 LAB
ANION GAP SERPL CALC-SCNC: 11 MMOL/L (ref 10–20)
BUN SERPL-MCNC: 34 MG/DL (ref 6–23)
CALCIUM SERPL-MCNC: 8.7 MG/DL (ref 8.6–10.3)
CHLORIDE SERPL-SCNC: 114 MMOL/L (ref 98–107)
CO2 SERPL-SCNC: 24 MMOL/L (ref 21–32)
CREAT SERPL-MCNC: 1.68 MG/DL (ref 0.5–1.05)
EGFRCR SERPLBLD CKD-EPI 2021: 28 ML/MIN/1.73M*2
ERYTHROCYTE [DISTWIDTH] IN BLOOD BY AUTOMATED COUNT: 15.8 % (ref 11.5–14.5)
GLUCOSE SERPL-MCNC: 87 MG/DL (ref 74–99)
HCT VFR BLD AUTO: 31.1 % (ref 36–46)
HGB BLD-MCNC: 9.7 G/DL (ref 12–16)
MCH RBC QN AUTO: 31.6 PG (ref 26–34)
MCHC RBC AUTO-ENTMCNC: 31.2 G/DL (ref 32–36)
MCV RBC AUTO: 101 FL (ref 80–100)
NRBC BLD-RTO: 0 /100 WBCS (ref 0–0)
PLATELET # BLD AUTO: 271 X10*3/UL (ref 150–450)
POTASSIUM SERPL-SCNC: 4.5 MMOL/L (ref 3.5–5.3)
RBC # BLD AUTO: 3.07 X10*6/UL (ref 4–5.2)
SODIUM SERPL-SCNC: 144 MMOL/L (ref 136–145)
WBC # BLD AUTO: 7.2 X10*3/UL (ref 4.4–11.3)

## 2025-06-05 PROCEDURE — 85027 COMPLETE CBC AUTOMATED: CPT | Mod: OUT | Performed by: INTERNAL MEDICINE

## 2025-06-05 PROCEDURE — 80048 BASIC METABOLIC PNL TOTAL CA: CPT | Mod: OUT | Performed by: INTERNAL MEDICINE

## 2025-06-05 PROCEDURE — 36415 COLL VENOUS BLD VENIPUNCTURE: CPT | Mod: OUT | Performed by: INTERNAL MEDICINE
